# Patient Record
Sex: FEMALE | Race: WHITE | NOT HISPANIC OR LATINO | Employment: FULL TIME | URBAN - METROPOLITAN AREA
[De-identification: names, ages, dates, MRNs, and addresses within clinical notes are randomized per-mention and may not be internally consistent; named-entity substitution may affect disease eponyms.]

---

## 2020-08-20 ENCOUNTER — TELEPHONE (OUTPATIENT)
Dept: FAMILY MEDICINE | Facility: CLINIC | Age: 50
End: 2020-08-20

## 2020-08-20 NOTE — TELEPHONE ENCOUNTER
----- Message from Jaye Gonsales sent at 8/19/2020  3:17 PM CDT -----  Regarding: right knee  Name of Caller pt  Reason for Visit/Symptom right knee pain. Pt is requesting to be seen asap for est care and knee pain. Call pt  Best Contact Number or Confirm if Mychart Preferred. [phone] (home)     Preferred Date/Time of Appointment this week  Interested in Virtual Visit (yes/no)no  Additional Informatio

## 2020-09-17 ENCOUNTER — OFFICE VISIT (OUTPATIENT)
Dept: FAMILY MEDICINE | Facility: CLINIC | Age: 50
End: 2020-09-17
Payer: COMMERCIAL

## 2020-09-17 VITALS
HEIGHT: 67 IN | WEIGHT: 211.63 LBS | TEMPERATURE: 98 F | BODY MASS INDEX: 33.21 KG/M2 | OXYGEN SATURATION: 97 % | DIASTOLIC BLOOD PRESSURE: 80 MMHG | SYSTOLIC BLOOD PRESSURE: 122 MMHG | HEART RATE: 61 BPM

## 2020-09-17 DIAGNOSIS — Z11.59 ENCOUNTER FOR HEPATITIS C SCREENING TEST FOR LOW RISK PATIENT: ICD-10-CM

## 2020-09-17 DIAGNOSIS — M17.11 ARTHRITIS OF KNEE, RIGHT: ICD-10-CM

## 2020-09-17 DIAGNOSIS — Z00.00 ANNUAL PHYSICAL EXAM: Primary | ICD-10-CM

## 2020-09-17 DIAGNOSIS — Z12.39 BREAST CANCER SCREENING: ICD-10-CM

## 2020-09-17 PROCEDURE — 99999 PR PBB SHADOW E&M-EST. PATIENT-LVL III: CPT | Mod: PBBFAC,,, | Performed by: FAMILY MEDICINE

## 2020-09-17 PROCEDURE — 90686 IIV4 VACC NO PRSV 0.5 ML IM: CPT | Mod: S$GLB,,, | Performed by: FAMILY MEDICINE

## 2020-09-17 PROCEDURE — 3008F BODY MASS INDEX DOCD: CPT | Mod: CPTII,S$GLB,, | Performed by: FAMILY MEDICINE

## 2020-09-17 PROCEDURE — 3008F PR BODY MASS INDEX (BMI) DOCUMENTED: ICD-10-PCS | Mod: CPTII,S$GLB,, | Performed by: FAMILY MEDICINE

## 2020-09-17 PROCEDURE — 99204 OFFICE O/P NEW MOD 45 MIN: CPT | Mod: 25,S$GLB,, | Performed by: FAMILY MEDICINE

## 2020-09-17 PROCEDURE — 90686 FLU VACCINE (QUAD) GREATER THAN OR EQUAL TO 3YO PRESERVATIVE FREE IM: ICD-10-PCS | Mod: S$GLB,,, | Performed by: FAMILY MEDICINE

## 2020-09-17 PROCEDURE — 90471 IMMUNIZATION ADMIN: CPT | Mod: S$GLB,,, | Performed by: FAMILY MEDICINE

## 2020-09-17 PROCEDURE — 99999 PR PBB SHADOW E&M-EST. PATIENT-LVL III: ICD-10-PCS | Mod: PBBFAC,,, | Performed by: FAMILY MEDICINE

## 2020-09-17 PROCEDURE — 90471 FLU VACCINE (QUAD) GREATER THAN OR EQUAL TO 3YO PRESERVATIVE FREE IM: ICD-10-PCS | Mod: S$GLB,,, | Performed by: FAMILY MEDICINE

## 2020-09-17 PROCEDURE — 99204 PR OFFICE/OUTPT VISIT, NEW, LEVL IV, 45-59 MIN: ICD-10-PCS | Mod: 25,S$GLB,, | Performed by: FAMILY MEDICINE

## 2020-09-17 RX ORDER — CELECOXIB 100 MG/1
100 CAPSULE ORAL 2 TIMES DAILY
Qty: 60 CAPSULE | Refills: 5 | Status: SHIPPED | OUTPATIENT
Start: 2020-09-17 | End: 2021-07-23

## 2020-09-17 RX ORDER — MULTIVITAMIN
1 TABLET ORAL DAILY
COMMUNITY
End: 2021-06-29

## 2020-09-17 NOTE — PROGRESS NOTES
Administered Influenza Vaccine 0.5mL IM to right deltoid. No s/s of any adverse reaction noted.

## 2020-09-17 NOTE — PROGRESS NOTES
Subjective:       Patient ID: Wendy Cody is a 50 y.o. female.    Chief Complaint: Establish Care    50 year old female presents with as a new patient to me.     She has pain in her right knee. She state she tried to rest it to help. It is better in the morning and worsens with walking. She has had knee issues and has had injections in the past in her 20's. She has pain along the medial aspect of her knee. She wears insoles to help with arch support. She does run and cycle. She states it hurts with this. She takes naproxen, which doesn't help as much. Her knee doesn't swell or give out.     She also has a pain along the bottom of her foot.    She is due for a papsmear. She is due for a mammogram.     She had labs done last year. Her cholesterol is slightly elevated.     Past Medical History:  No date: TBI (traumatic brain injury)   Past Surgical History:  No date: BUNIONECTOMY      Comment:    No date:  SECTION      Comment:  ,   No date: hand surgery      Comment:  fracture and ligament and revision  No date: NASAL POLYP EXCISION  No date: TONSILLECTOMY  Review of patient's family history indicates:  Problem: Diabetes type II      Relation: Mother          Age of Onset: (Not Specified)  Problem: Lymphoma      Relation: Mother          Age of Onset: (Not Specified)  Problem: Heart attack      Relation: Father          Age of Onset: 38  Problem: Hyperlipidemia      Relation: Father          Age of Onset: (Not Specified)  Problem: Ovarian cancer      Relation: Sister          Age of Onset: (Not Specified)  Problem: Alzheimer's disease      Relation: Maternal Grandmother          Age of Onset: (Not Specified)  Problem: Alzheimer's disease      Relation: Paternal Grandmother          Age of Onset: (Not Specified)    Social History    Socioeconomic History      Marital status:       Spouse name: Not on file      Number of children: 2      Years of education: Not on file      Highest  "education level: Not on file    Occupational History        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    Social Needs      Financial resource strain: Not on file      Food insecurity        Worry: Not on file        Inability: Not on file      Transportation needs        Medical: Not on file        Non-medical: Not on file    Tobacco Use      Smoking status: Never Smoker      Smokeless tobacco: Never Used    Substance and Sexual Activity      Alcohol use: Not Currently      Drug use: Not on file      Sexual activity: Not on file    Lifestyle      Physical activity        Days per week: Not on file        Minutes per session: Not on file      Stress: Not on file    Relationships      Social connections        Talks on phone: Not on file        Gets together: Not on file        Attends Druze service: Not on file        Active member of club or organization: Not on file        Attends meetings of clubs or organizations: Not on file        Relationship status: Not on file    Other Topics      Concerns:        Not on file    Social History Narrative      Not on file        Review of Systems      Objective:       Vitals:    09/17/20 0846   BP: 122/80   Pulse: 61   Temp: 98 °F (36.7 °C)   TempSrc: Oral   SpO2: 97%   Weight: 96 kg (211 lb 10.3 oz)   Height: 5' 7" (1.702 m)     Physical Exam  Constitutional:       General: She is not in acute distress.     Appearance: She is well-developed. She is not diaphoretic.   HENT:      Head: Normocephalic.      Right Ear: External ear normal.      Left Ear: External ear normal.      Mouth/Throat:      Pharynx: No oropharyngeal exudate.   Eyes:      Conjunctiva/sclera: Conjunctivae normal.   Neck:      Musculoskeletal: Normal range of motion and neck supple.      Thyroid: No thyromegaly.   Cardiovascular:      Rate and Rhythm: Normal rate and regular rhythm.      Heart sounds: Normal heart sounds. No murmur. No friction rub. No gallop.  "   Pulmonary:      Effort: Pulmonary effort is normal. No respiratory distress.      Breath sounds: Normal breath sounds. No wheezing or rales.   Abdominal:      General: Bowel sounds are normal. There is no distension.      Palpations: Abdomen is soft. There is no mass.      Tenderness: There is no abdominal tenderness. There is no guarding or rebound.   Musculoskeletal:      Right knee: She exhibits normal range of motion, no swelling, no effusion, no ecchymosis, no LCL laxity, normal patellar mobility and no MCL laxity. Tenderness found. Medial joint line tenderness noted.        Feet:    Lymphadenopathy:      Cervical: No cervical adenopathy.   Skin:     Findings: No rash.   Neurological:      Mental Status: She is alert.   Psychiatric:         Mood and Affect: Mood normal.         Assessment:       1. Annual physical exam    2. Encounter for hepatitis C screening test for low risk patient    3. Arthritis of knee, right    4. Breast cancer screening        Plan:       Wendy was seen today for establish care.    Diagnoses and all orders for this visit:    Annual physical exam  -     Lipid Panel; Future  -     Comprehensive metabolic panel; Future  -     CBC auto differential; Future  -     TSH; Future  Due for labs  Encounter for hepatitis C screening test for low risk patient  -     Hepatitis C Antibody; Future    Arthritis of knee, right  -     celecoxib (CELEBREX) 100 MG capsule; Take 1 capsule (100 mg total) by mouth 2 (two) times daily.  Starting celebrex for knee arthritis.     Breast cancer screening  -     Mammo Digital Screening Bilat; Future  Due for mammogram  Other orders  -     Influenza - Quadrivalent *Preferred* (6 months+) (PF)

## 2020-09-17 NOTE — LETTER
September 17, 2020      [facility] [place] [address] [place] [address]  Phone: [phone]  Fax: [phone]       Patient: Wendy Cody   YOB: 1970  Date of Visit: 09/17/2020    To Whom It May Concern:    Shadia Cody  was at [facility] on 09/17/2020. She received the Influenza Vaccine. If you have any questions or concerns, or if I can be of further assistance, please do not hesitate to contact me.    Sincerely,        Saba Schulz LPN

## 2020-09-18 ENCOUNTER — HOSPITAL ENCOUNTER (OUTPATIENT)
Dept: RADIOLOGY | Facility: HOSPITAL | Age: 50
Discharge: HOME | End: 2020-09-18
Attending: FAMILY MEDICINE
Payer: COMMERCIAL

## 2020-09-18 DIAGNOSIS — Z12.39 BREAST CANCER SCREENING: ICD-10-CM

## 2020-09-18 PROCEDURE — 77067 MAMMO DIGITAL SCREENING BILAT WITH TOMO: ICD-10-PCS | Mod: 26,,, | Performed by: RADIOLOGY

## 2020-09-18 PROCEDURE — 77063 MAMMO DIGITAL SCREENING BILAT WITH TOMO: ICD-10-PCS | Mod: 26,,, | Performed by: RADIOLOGY

## 2020-09-18 PROCEDURE — 77063 BREAST TOMOSYNTHESIS BI: CPT | Mod: 26,,, | Performed by: RADIOLOGY

## 2020-09-18 PROCEDURE — 77067 SCR MAMMO BI INCL CAD: CPT | Mod: TC

## 2020-09-18 PROCEDURE — 77067 SCR MAMMO BI INCL CAD: CPT | Mod: 26,,, | Performed by: RADIOLOGY

## 2020-09-21 ENCOUNTER — TELEPHONE (OUTPATIENT)
Dept: FAMILY MEDICINE | Facility: CLINIC | Age: 50
End: 2020-09-21

## 2020-09-21 RX ORDER — PRAVASTATIN SODIUM 40 MG/1
40 TABLET ORAL DAILY
Qty: 90 TABLET | Refills: 3 | Status: SHIPPED | OUTPATIENT
Start: 2020-09-21 | End: 2021-07-23

## 2020-09-23 ENCOUNTER — TELEPHONE (OUTPATIENT)
Dept: FAMILY MEDICINE | Facility: CLINIC | Age: 50
End: 2020-09-23

## 2020-09-23 NOTE — TELEPHONE ENCOUNTER
----- Message from Joana Goldberg MD sent at 9/21/2020  8:38 PM CDT -----  Please let patient know her mammogram results are normal. Repeat in 1 year.

## 2020-09-29 ENCOUNTER — OFFICE VISIT (OUTPATIENT)
Dept: FAMILY MEDICINE | Facility: CLINIC | Age: 50
End: 2020-09-29
Payer: COMMERCIAL

## 2020-09-29 VITALS
TEMPERATURE: 98 F | BODY MASS INDEX: 33.57 KG/M2 | OXYGEN SATURATION: 98 % | WEIGHT: 213.88 LBS | HEIGHT: 67 IN | SYSTOLIC BLOOD PRESSURE: 126 MMHG | DIASTOLIC BLOOD PRESSURE: 84 MMHG | HEART RATE: 66 BPM

## 2020-09-29 DIAGNOSIS — Z01.419 ROUTINE GYNECOLOGICAL EXAMINATION: Primary | ICD-10-CM

## 2020-09-29 DIAGNOSIS — M17.11 ARTHRITIS OF KNEE, RIGHT: ICD-10-CM

## 2020-09-29 PROCEDURE — 87624 HPV HI-RISK TYP POOLED RSLT: CPT

## 2020-09-29 PROCEDURE — 99214 PR OFFICE/OUTPT VISIT, EST, LEVL IV, 30-39 MIN: ICD-10-PCS | Mod: S$GLB,,, | Performed by: FAMILY MEDICINE

## 2020-09-29 PROCEDURE — 99999 PR PBB SHADOW E&M-EST. PATIENT-LVL III: ICD-10-PCS | Mod: PBBFAC,,, | Performed by: FAMILY MEDICINE

## 2020-09-29 PROCEDURE — 3008F BODY MASS INDEX DOCD: CPT | Mod: CPTII,S$GLB,, | Performed by: FAMILY MEDICINE

## 2020-09-29 PROCEDURE — 3008F PR BODY MASS INDEX (BMI) DOCUMENTED: ICD-10-PCS | Mod: CPTII,S$GLB,, | Performed by: FAMILY MEDICINE

## 2020-09-29 PROCEDURE — 99999 PR PBB SHADOW E&M-EST. PATIENT-LVL III: CPT | Mod: PBBFAC,,, | Performed by: FAMILY MEDICINE

## 2020-09-29 PROCEDURE — 88175 CYTOPATH C/V AUTO FLUID REDO: CPT

## 2020-09-29 PROCEDURE — 99214 OFFICE O/P EST MOD 30 MIN: CPT | Mod: S$GLB,,, | Performed by: FAMILY MEDICINE

## 2020-09-29 NOTE — PROGRESS NOTES
Subjective:       Patient ID: Wendy Cody is a 50 y.o. female.    Chief Complaint: Gynecologic Exam and Discuss knee concerns    50 year old female presents for followup on right knee pain. She states the celebrex has helped, but she started running again. She reinjured it again. She has pain along the medial joint line. She is using a knee brace that helps. It is better today.       She is also here for her papsmear. Her last papsmear was 4 years ago. It was normal. She has no vaginal discharge or irritation. She has no dysuria. She has no pelvic pain. She states her sister had ovarian cancer. She has no family history of breast or uterine cancer.     Past Medical History:   Diagnosis Date    TBI (traumatic brain injury)       Past Surgical History:   Procedure Laterality Date    BUNIONECTOMY      2015     SECTION      ,     hand surgery      fracture and ligament and revision    NASAL POLYP EXCISION      TONSILLECTOMY       Family History   Problem Relation Age of Onset    Diabetes type II Mother     Lymphoma Mother     Heart attack Father 38    Hyperlipidemia Father     Ovarian cancer Sister 38    Alzheimer's disease Maternal Grandmother     Alzheimer's disease Paternal Grandmother      Social History     Socioeconomic History    Marital status:      Spouse name: Not on file    Number of children: 2    Years of education: Not on file    Highest education level: Not on file   Occupational History     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   Social Needs    Financial resource strain: Not on file    Food insecurity     Worry: Not on file     Inability: Not on file    Transportation needs     Medical: Not on file     Non-medical: Not on file   Tobacco Use    Smoking status: Never Smoker    Smokeless tobacco: Never Used   Substance and Sexual Activity    Alcohol use: Not Currently    Drug use: Not on file    Sexual activity:  "Not on file   Lifestyle    Physical activity     Days per week: Not on file     Minutes per session: Not on file    Stress: Not on file   Relationships    Social connections     Talks on phone: Not on file     Gets together: Not on file     Attends Zoroastrianism service: Not on file     Active member of club or organization: Not on file     Attends meetings of clubs or organizations: Not on file     Relationship status: Not on file   Other Topics Concern    Not on file   Social History Narrative    Not on file       Review of Systems      Objective:       Vitals:    09/29/20 1016   BP: 126/84   Pulse: 66   Temp: 98.4 °F (36.9 °C)   TempSrc: Oral   SpO2: 98%   Weight: 97 kg (213 lb 13.5 oz)   Height: 5' 7" (1.702 m)       Physical Exam  Constitutional:       General: She is not in acute distress.     Appearance: She is well-developed. She is not ill-appearing, toxic-appearing or diaphoretic.   HENT:      Head: Normocephalic and atraumatic.   Eyes:      Conjunctiva/sclera: Conjunctivae normal.   Neck:      Musculoskeletal: Normal range of motion and neck supple.      Thyroid: No thyromegaly.   Cardiovascular:      Rate and Rhythm: Normal rate and regular rhythm.      Heart sounds: Normal heart sounds. No murmur. No friction rub. No gallop.    Pulmonary:      Effort: Pulmonary effort is normal. No respiratory distress.      Breath sounds: Normal breath sounds. No stridor. No wheezing, rhonchi or rales.   Chest:      Chest wall: No tenderness.      Breasts: Breasts are symmetrical.         Right: No inverted nipple, mass, nipple discharge, skin change or tenderness.         Left: No inverted nipple, mass, nipple discharge, skin change or tenderness.   Genitourinary:     Labia:         Right: No rash, tenderness, lesion or injury.         Left: No rash, tenderness, lesion or injury.       Vagina: No signs of injury and foreign body. No vaginal discharge, erythema, tenderness or bleeding.      Cervix: No cervical motion " tenderness, discharge or friability.      Uterus: Enlarged. Not deviated.    Musculoskeletal:      Right knee: She exhibits swelling and effusion. She exhibits normal range of motion, no ecchymosis, no deformity, normal alignment, no LCL laxity, normal patellar mobility, normal meniscus and no MCL laxity. Tenderness found. Medial joint line tenderness noted.        Legs:    Neurological:      Mental Status: She is alert.         Assessment:       1. Routine gynecological examination    2. Arthritis of knee, right        Plan:       Wendy was seen today for gynecologic exam and discuss knee concerns.    Diagnoses and all orders for this visit:    Routine gynecological examination  -     Liquid-Based Pap Smear, Screening  -     HPV High Risk Genotypes, PCR    Arthritis of knee, right         Apply a compressive ACE bandage. Rest and elevate the affected painful area.  Apply cold compresses intermittently as needed.  As pain recedes, begin normal activities slowly as tolerated.  Call if symptoms persist.

## 2020-10-06 ENCOUNTER — OFFICE VISIT (OUTPATIENT)
Dept: FAMILY MEDICINE | Facility: CLINIC | Age: 50
End: 2020-10-06
Payer: COMMERCIAL

## 2020-10-06 VITALS
SYSTOLIC BLOOD PRESSURE: 134 MMHG | OXYGEN SATURATION: 99 % | BODY MASS INDEX: 33.12 KG/M2 | RESPIRATION RATE: 16 BRPM | TEMPERATURE: 98 F | HEIGHT: 67 IN | HEART RATE: 62 BPM | DIASTOLIC BLOOD PRESSURE: 80 MMHG | WEIGHT: 211 LBS

## 2020-10-06 DIAGNOSIS — L08.9 INFECTED LESION OF SKIN: Primary | ICD-10-CM

## 2020-10-06 PROCEDURE — 3008F PR BODY MASS INDEX (BMI) DOCUMENTED: ICD-10-PCS | Mod: CPTII,S$GLB,, | Performed by: PHYSICIAN ASSISTANT

## 2020-10-06 PROCEDURE — 99214 PR OFFICE/OUTPT VISIT, EST, LEVL IV, 30-39 MIN: ICD-10-PCS | Mod: S$GLB,,, | Performed by: PHYSICIAN ASSISTANT

## 2020-10-06 PROCEDURE — 99999 PR PBB SHADOW E&M-EST. PATIENT-LVL III: CPT | Mod: PBBFAC,,, | Performed by: PHYSICIAN ASSISTANT

## 2020-10-06 PROCEDURE — 99214 OFFICE O/P EST MOD 30 MIN: CPT | Mod: S$GLB,,, | Performed by: PHYSICIAN ASSISTANT

## 2020-10-06 PROCEDURE — 3008F BODY MASS INDEX DOCD: CPT | Mod: CPTII,S$GLB,, | Performed by: PHYSICIAN ASSISTANT

## 2020-10-06 PROCEDURE — 99999 PR PBB SHADOW E&M-EST. PATIENT-LVL III: ICD-10-PCS | Mod: PBBFAC,,, | Performed by: PHYSICIAN ASSISTANT

## 2020-10-06 RX ORDER — CEPHALEXIN 500 MG/1
500 CAPSULE ORAL EVERY 12 HOURS
Qty: 14 CAPSULE | Refills: 0 | Status: SHIPPED | OUTPATIENT
Start: 2020-10-06 | End: 2020-10-13

## 2020-10-09 ENCOUNTER — PATIENT MESSAGE (OUTPATIENT)
Dept: FAMILY MEDICINE | Facility: CLINIC | Age: 50
End: 2020-10-09

## 2020-10-12 LAB
HPV HR 12 DNA SPEC QL NAA+PROBE: NEGATIVE
HPV16 AG SPEC QL: NEGATIVE
HPV18 DNA SPEC QL NAA+PROBE: NEGATIVE

## 2020-10-21 LAB
FINAL PATHOLOGIC DIAGNOSIS: NORMAL
Lab: NORMAL

## 2020-12-22 ENCOUNTER — PATIENT MESSAGE (OUTPATIENT)
Dept: FAMILY MEDICINE | Facility: CLINIC | Age: 50
End: 2020-12-22

## 2020-12-22 DIAGNOSIS — E78.5 HYPERLIPIDEMIA, UNSPECIFIED HYPERLIPIDEMIA TYPE: ICD-10-CM

## 2020-12-29 ENCOUNTER — LAB VISIT (OUTPATIENT)
Dept: LAB | Facility: HOSPITAL | Age: 50
End: 2020-12-29
Attending: FAMILY MEDICINE
Payer: COMMERCIAL

## 2020-12-29 DIAGNOSIS — E78.5 HYPERLIPIDEMIA, UNSPECIFIED HYPERLIPIDEMIA TYPE: ICD-10-CM

## 2020-12-29 LAB
ALBUMIN SERPL BCP-MCNC: 3.9 G/DL (ref 3.5–5.2)
ALP SERPL-CCNC: 59 U/L (ref 55–135)
ALT SERPL W/O P-5'-P-CCNC: 16 U/L (ref 10–44)
ANION GAP SERPL CALC-SCNC: 5 MMOL/L (ref 8–16)
AST SERPL-CCNC: 16 U/L (ref 10–40)
BILIRUB SERPL-MCNC: 0.4 MG/DL (ref 0.1–1)
BUN SERPL-MCNC: 10 MG/DL (ref 6–20)
CALCIUM SERPL-MCNC: 8.4 MG/DL (ref 8.7–10.5)
CHLORIDE SERPL-SCNC: 107 MMOL/L (ref 95–110)
CHOLEST SERPL-MCNC: 217 MG/DL (ref 120–199)
CHOLEST/HDLC SERPL: 5 {RATIO} (ref 2–5)
CO2 SERPL-SCNC: 28 MMOL/L (ref 23–29)
CREAT SERPL-MCNC: 0.8 MG/DL (ref 0.5–1.4)
EST. GFR  (AFRICAN AMERICAN): >60 ML/MIN/1.73 M^2
EST. GFR  (NON AFRICAN AMERICAN): >60 ML/MIN/1.73 M^2
GLUCOSE SERPL-MCNC: 95 MG/DL (ref 70–110)
HDLC SERPL-MCNC: 43 MG/DL (ref 40–75)
HDLC SERPL: 19.8 % (ref 20–50)
LDLC SERPL CALC-MCNC: 151.6 MG/DL (ref 63–159)
NONHDLC SERPL-MCNC: 174 MG/DL
POTASSIUM SERPL-SCNC: 4.6 MMOL/L (ref 3.5–5.1)
PROT SERPL-MCNC: 6.9 G/DL (ref 6–8.4)
SODIUM SERPL-SCNC: 140 MMOL/L (ref 136–145)
TRIGL SERPL-MCNC: 112 MG/DL (ref 30–150)

## 2020-12-29 PROCEDURE — 80053 COMPREHEN METABOLIC PANEL: CPT

## 2020-12-29 PROCEDURE — 36415 COLL VENOUS BLD VENIPUNCTURE: CPT

## 2020-12-29 PROCEDURE — 80061 LIPID PANEL: CPT

## 2021-06-29 ENCOUNTER — OFFICE VISIT (OUTPATIENT)
Dept: FAMILY MEDICINE | Facility: CLINIC | Age: 51
End: 2021-06-29
Payer: COMMERCIAL

## 2021-06-29 VITALS
OXYGEN SATURATION: 98 % | WEIGHT: 207.44 LBS | TEMPERATURE: 98 F | HEIGHT: 67 IN | SYSTOLIC BLOOD PRESSURE: 138 MMHG | HEART RATE: 58 BPM | BODY MASS INDEX: 32.56 KG/M2 | DIASTOLIC BLOOD PRESSURE: 82 MMHG

## 2021-06-29 DIAGNOSIS — M54.41 CHRONIC RIGHT-SIDED LOW BACK PAIN WITH RIGHT-SIDED SCIATICA: Primary | ICD-10-CM

## 2021-06-29 DIAGNOSIS — G89.29 CHRONIC RIGHT-SIDED LOW BACK PAIN WITH RIGHT-SIDED SCIATICA: Primary | ICD-10-CM

## 2021-06-29 PROCEDURE — 3008F PR BODY MASS INDEX (BMI) DOCUMENTED: ICD-10-PCS | Mod: CPTII,S$GLB,, | Performed by: NURSE PRACTITIONER

## 2021-06-29 PROCEDURE — 99999 PR PBB SHADOW E&M-EST. PATIENT-LVL IV: ICD-10-PCS | Mod: PBBFAC,,, | Performed by: NURSE PRACTITIONER

## 2021-06-29 PROCEDURE — 99214 PR OFFICE/OUTPT VISIT, EST, LEVL IV, 30-39 MIN: ICD-10-PCS | Mod: S$GLB,,, | Performed by: NURSE PRACTITIONER

## 2021-06-29 PROCEDURE — 3008F BODY MASS INDEX DOCD: CPT | Mod: CPTII,S$GLB,, | Performed by: NURSE PRACTITIONER

## 2021-06-29 PROCEDURE — 99999 PR PBB SHADOW E&M-EST. PATIENT-LVL IV: CPT | Mod: PBBFAC,,, | Performed by: NURSE PRACTITIONER

## 2021-06-29 PROCEDURE — 1125F PR PAIN SEVERITY QUANTIFIED, PAIN PRESENT: ICD-10-PCS | Mod: S$GLB,,, | Performed by: NURSE PRACTITIONER

## 2021-06-29 PROCEDURE — 1125F AMNT PAIN NOTED PAIN PRSNT: CPT | Mod: S$GLB,,, | Performed by: NURSE PRACTITIONER

## 2021-06-29 PROCEDURE — 99214 OFFICE O/P EST MOD 30 MIN: CPT | Mod: S$GLB,,, | Performed by: NURSE PRACTITIONER

## 2021-07-22 ENCOUNTER — TELEPHONE (OUTPATIENT)
Dept: PAIN MEDICINE | Facility: CLINIC | Age: 51
End: 2021-07-22

## 2021-07-23 ENCOUNTER — OFFICE VISIT (OUTPATIENT)
Dept: PAIN MEDICINE | Facility: CLINIC | Age: 51
End: 2021-07-23
Payer: COMMERCIAL

## 2021-07-23 VITALS
HEART RATE: 60 BPM | TEMPERATURE: 98 F | OXYGEN SATURATION: 99 % | HEIGHT: 67 IN | DIASTOLIC BLOOD PRESSURE: 78 MMHG | WEIGHT: 205.69 LBS | RESPIRATION RATE: 18 BRPM | SYSTOLIC BLOOD PRESSURE: 136 MMHG | BODY MASS INDEX: 32.28 KG/M2

## 2021-07-23 DIAGNOSIS — M54.16 LUMBAR RADICULOPATHY: ICD-10-CM

## 2021-07-23 DIAGNOSIS — M54.41 CHRONIC RIGHT-SIDED LOW BACK PAIN WITH RIGHT-SIDED SCIATICA: ICD-10-CM

## 2021-07-23 DIAGNOSIS — M47.816 LUMBAR SPONDYLOSIS: ICD-10-CM

## 2021-07-23 DIAGNOSIS — G89.29 CHRONIC RIGHT-SIDED LOW BACK PAIN WITH RIGHT-SIDED SCIATICA: ICD-10-CM

## 2021-07-23 DIAGNOSIS — M51.36 DDD (DEGENERATIVE DISC DISEASE), LUMBAR: Primary | ICD-10-CM

## 2021-07-23 PROBLEM — M51.369 DDD (DEGENERATIVE DISC DISEASE), LUMBAR: Status: ACTIVE | Noted: 2021-07-23

## 2021-07-23 PROCEDURE — 99204 PR OFFICE/OUTPT VISIT, NEW, LEVL IV, 45-59 MIN: ICD-10-PCS | Mod: S$GLB,,, | Performed by: PAIN MEDICINE

## 2021-07-23 PROCEDURE — 99204 OFFICE O/P NEW MOD 45 MIN: CPT | Mod: S$GLB,,, | Performed by: PAIN MEDICINE

## 2021-07-23 PROCEDURE — 3008F PR BODY MASS INDEX (BMI) DOCUMENTED: ICD-10-PCS | Mod: CPTII,S$GLB,, | Performed by: PAIN MEDICINE

## 2021-07-23 PROCEDURE — 1125F PR PAIN SEVERITY QUANTIFIED, PAIN PRESENT: ICD-10-PCS | Mod: CPTII,S$GLB,, | Performed by: PAIN MEDICINE

## 2021-07-23 PROCEDURE — 99999 PR PBB SHADOW E&M-EST. PATIENT-LVL V: CPT | Mod: PBBFAC,,, | Performed by: PAIN MEDICINE

## 2021-07-23 PROCEDURE — 1125F AMNT PAIN NOTED PAIN PRSNT: CPT | Mod: CPTII,S$GLB,, | Performed by: PAIN MEDICINE

## 2021-07-23 PROCEDURE — 99999 PR PBB SHADOW E&M-EST. PATIENT-LVL V: ICD-10-PCS | Mod: PBBFAC,,, | Performed by: PAIN MEDICINE

## 2021-07-23 PROCEDURE — 3008F BODY MASS INDEX DOCD: CPT | Mod: CPTII,S$GLB,, | Performed by: PAIN MEDICINE

## 2021-08-06 ENCOUNTER — TELEPHONE (OUTPATIENT)
Dept: PAIN MEDICINE | Facility: CLINIC | Age: 51
End: 2021-08-06

## 2021-08-09 ENCOUNTER — OFFICE VISIT (OUTPATIENT)
Dept: PAIN MEDICINE | Facility: CLINIC | Age: 51
End: 2021-08-09
Payer: COMMERCIAL

## 2021-08-09 VITALS
SYSTOLIC BLOOD PRESSURE: 137 MMHG | BODY MASS INDEX: 32.36 KG/M2 | WEIGHT: 206.19 LBS | TEMPERATURE: 98 F | OXYGEN SATURATION: 97 % | HEIGHT: 67 IN | HEART RATE: 60 BPM | DIASTOLIC BLOOD PRESSURE: 82 MMHG

## 2021-08-09 DIAGNOSIS — M51.36 DDD (DEGENERATIVE DISC DISEASE), LUMBAR: Primary | ICD-10-CM

## 2021-08-09 DIAGNOSIS — M54.16 LUMBAR RADICULOPATHY: ICD-10-CM

## 2021-08-09 DIAGNOSIS — M47.816 LUMBAR SPONDYLOSIS: ICD-10-CM

## 2021-08-09 PROCEDURE — 99213 PR OFFICE/OUTPT VISIT, EST, LEVL III, 20-29 MIN: ICD-10-PCS | Mod: S$GLB,,, | Performed by: PAIN MEDICINE

## 2021-08-09 PROCEDURE — 1159F PR MEDICATION LIST DOCUMENTED IN MEDICAL RECORD: ICD-10-PCS | Mod: CPTII,S$GLB,, | Performed by: PAIN MEDICINE

## 2021-08-09 PROCEDURE — 1160F PR REVIEW ALL MEDS BY PRESCRIBER/CLIN PHARMACIST DOCUMENTED: ICD-10-PCS | Mod: CPTII,S$GLB,, | Performed by: PAIN MEDICINE

## 2021-08-09 PROCEDURE — 99999 PR PBB SHADOW E&M-EST. PATIENT-LVL III: CPT | Mod: PBBFAC,,, | Performed by: PAIN MEDICINE

## 2021-08-09 PROCEDURE — 99999 PR PBB SHADOW E&M-EST. PATIENT-LVL III: ICD-10-PCS | Mod: PBBFAC,,, | Performed by: PAIN MEDICINE

## 2021-08-09 PROCEDURE — 1125F PR PAIN SEVERITY QUANTIFIED, PAIN PRESENT: ICD-10-PCS | Mod: CPTII,S$GLB,, | Performed by: PAIN MEDICINE

## 2021-08-09 PROCEDURE — 3075F SYST BP GE 130 - 139MM HG: CPT | Mod: CPTII,S$GLB,, | Performed by: PAIN MEDICINE

## 2021-08-09 PROCEDURE — 3079F DIAST BP 80-89 MM HG: CPT | Mod: CPTII,S$GLB,, | Performed by: PAIN MEDICINE

## 2021-08-09 PROCEDURE — 1125F AMNT PAIN NOTED PAIN PRSNT: CPT | Mod: CPTII,S$GLB,, | Performed by: PAIN MEDICINE

## 2021-08-09 PROCEDURE — 3079F PR MOST RECENT DIASTOLIC BLOOD PRESSURE 80-89 MM HG: ICD-10-PCS | Mod: CPTII,S$GLB,, | Performed by: PAIN MEDICINE

## 2021-08-09 PROCEDURE — 3008F BODY MASS INDEX DOCD: CPT | Mod: CPTII,S$GLB,, | Performed by: PAIN MEDICINE

## 2021-08-09 PROCEDURE — 3008F PR BODY MASS INDEX (BMI) DOCUMENTED: ICD-10-PCS | Mod: CPTII,S$GLB,, | Performed by: PAIN MEDICINE

## 2021-08-09 PROCEDURE — 1159F MED LIST DOCD IN RCRD: CPT | Mod: CPTII,S$GLB,, | Performed by: PAIN MEDICINE

## 2021-08-09 PROCEDURE — 3075F PR MOST RECENT SYSTOLIC BLOOD PRESS GE 130-139MM HG: ICD-10-PCS | Mod: CPTII,S$GLB,, | Performed by: PAIN MEDICINE

## 2021-08-09 PROCEDURE — 1160F RVW MEDS BY RX/DR IN RCRD: CPT | Mod: CPTII,S$GLB,, | Performed by: PAIN MEDICINE

## 2021-08-09 PROCEDURE — 99213 OFFICE O/P EST LOW 20 MIN: CPT | Mod: S$GLB,,, | Performed by: PAIN MEDICINE

## 2021-12-08 ENCOUNTER — PATIENT MESSAGE (OUTPATIENT)
Dept: ADMINISTRATIVE | Facility: HOSPITAL | Age: 51
End: 2021-12-08
Payer: COMMERCIAL

## 2021-12-08 DIAGNOSIS — Z12.31 OTHER SCREENING MAMMOGRAM: ICD-10-CM

## 2021-12-20 ENCOUNTER — OFFICE VISIT (OUTPATIENT)
Dept: FAMILY MEDICINE | Facility: CLINIC | Age: 51
End: 2021-12-20
Payer: COMMERCIAL

## 2021-12-20 VITALS
BODY MASS INDEX: 34.95 KG/M2 | TEMPERATURE: 98 F | OXYGEN SATURATION: 98 % | HEART RATE: 70 BPM | SYSTOLIC BLOOD PRESSURE: 120 MMHG | DIASTOLIC BLOOD PRESSURE: 84 MMHG | WEIGHT: 222.69 LBS | HEIGHT: 67 IN

## 2021-12-20 DIAGNOSIS — Z00.00 ANNUAL PHYSICAL EXAM: Primary | ICD-10-CM

## 2021-12-20 DIAGNOSIS — Z63.79 STRESSFUL LIFE EVENT AFFECTING FAMILY: ICD-10-CM

## 2021-12-20 DIAGNOSIS — R53.83 FATIGUE, UNSPECIFIED TYPE: ICD-10-CM

## 2021-12-20 PROCEDURE — 99999 PR PBB SHADOW E&M-EST. PATIENT-LVL III: CPT | Mod: PBBFAC,,, | Performed by: FAMILY MEDICINE

## 2021-12-20 PROCEDURE — 99999 PR PBB SHADOW E&M-EST. PATIENT-LVL III: ICD-10-PCS | Mod: PBBFAC,,, | Performed by: FAMILY MEDICINE

## 2021-12-20 PROCEDURE — 99396 PR PREVENTIVE VISIT,EST,40-64: ICD-10-PCS | Mod: S$GLB,,, | Performed by: FAMILY MEDICINE

## 2021-12-20 PROCEDURE — 99396 PREV VISIT EST AGE 40-64: CPT | Mod: S$GLB,,, | Performed by: FAMILY MEDICINE

## 2021-12-21 ENCOUNTER — LAB VISIT (OUTPATIENT)
Dept: LAB | Facility: HOSPITAL | Age: 51
End: 2021-12-21
Attending: FAMILY MEDICINE
Payer: COMMERCIAL

## 2021-12-21 DIAGNOSIS — Z00.00 ANNUAL PHYSICAL EXAM: ICD-10-CM

## 2021-12-21 LAB
ALBUMIN SERPL BCP-MCNC: 3.9 G/DL (ref 3.5–5.2)
ALP SERPL-CCNC: 64 U/L (ref 55–135)
ALT SERPL W/O P-5'-P-CCNC: 22 U/L (ref 10–44)
ANION GAP SERPL CALC-SCNC: 9 MMOL/L (ref 8–16)
AST SERPL-CCNC: 21 U/L (ref 10–40)
BASOPHILS # BLD AUTO: 0.05 K/UL (ref 0–0.2)
BASOPHILS NFR BLD: 0.9 % (ref 0–1.9)
BILIRUB SERPL-MCNC: 0.8 MG/DL (ref 0.1–1)
BUN SERPL-MCNC: 13 MG/DL (ref 6–20)
CALCIUM SERPL-MCNC: 8.9 MG/DL (ref 8.7–10.5)
CHLORIDE SERPL-SCNC: 105 MMOL/L (ref 95–110)
CHOLEST SERPL-MCNC: 265 MG/DL (ref 120–199)
CHOLEST/HDLC SERPL: 5.5 {RATIO} (ref 2–5)
CO2 SERPL-SCNC: 28 MMOL/L (ref 23–29)
CREAT SERPL-MCNC: 0.9 MG/DL (ref 0.5–1.4)
DIFFERENTIAL METHOD: NORMAL
EOSINOPHIL # BLD AUTO: 0.1 K/UL (ref 0–0.5)
EOSINOPHIL NFR BLD: 2.3 % (ref 0–8)
ERYTHROCYTE [DISTWIDTH] IN BLOOD BY AUTOMATED COUNT: 12.3 % (ref 11.5–14.5)
EST. GFR  (AFRICAN AMERICAN): >60 ML/MIN/1.73 M^2
EST. GFR  (NON AFRICAN AMERICAN): >60 ML/MIN/1.73 M^2
GLUCOSE SERPL-MCNC: 80 MG/DL (ref 70–110)
HCT VFR BLD AUTO: 41.9 % (ref 37–48.5)
HDLC SERPL-MCNC: 48 MG/DL (ref 40–75)
HDLC SERPL: 18.1 % (ref 20–50)
HGB BLD-MCNC: 13.7 G/DL (ref 12–16)
IMM GRANULOCYTES # BLD AUTO: 0.01 K/UL (ref 0–0.04)
IMM GRANULOCYTES NFR BLD AUTO: 0.2 % (ref 0–0.5)
LDLC SERPL CALC-MCNC: 196.4 MG/DL (ref 63–159)
LYMPHOCYTES # BLD AUTO: 1.6 K/UL (ref 1–4.8)
LYMPHOCYTES NFR BLD: 28.5 % (ref 18–48)
MCH RBC QN AUTO: 29.1 PG (ref 27–31)
MCHC RBC AUTO-ENTMCNC: 32.7 G/DL (ref 32–36)
MCV RBC AUTO: 89 FL (ref 82–98)
MONOCYTES # BLD AUTO: 0.5 K/UL (ref 0.3–1)
MONOCYTES NFR BLD: 9.5 % (ref 4–15)
NEUTROPHILS # BLD AUTO: 3.3 K/UL (ref 1.8–7.7)
NEUTROPHILS NFR BLD: 58.6 % (ref 38–73)
NONHDLC SERPL-MCNC: 217 MG/DL
NRBC BLD-RTO: 0 /100 WBC
PLATELET # BLD AUTO: 248 K/UL (ref 150–450)
PMV BLD AUTO: 10.8 FL (ref 9.2–12.9)
POTASSIUM SERPL-SCNC: 4.5 MMOL/L (ref 3.5–5.1)
PROT SERPL-MCNC: 6.9 G/DL (ref 6–8.4)
RBC # BLD AUTO: 4.71 M/UL (ref 4–5.4)
SODIUM SERPL-SCNC: 142 MMOL/L (ref 136–145)
TRIGL SERPL-MCNC: 103 MG/DL (ref 30–150)
TSH SERPL DL<=0.005 MIU/L-ACNC: 3.75 UIU/ML (ref 0.4–4)
WBC # BLD AUTO: 5.69 K/UL (ref 3.9–12.7)

## 2021-12-21 PROCEDURE — 85025 COMPLETE CBC W/AUTO DIFF WBC: CPT | Performed by: FAMILY MEDICINE

## 2021-12-21 PROCEDURE — 80061 LIPID PANEL: CPT | Performed by: FAMILY MEDICINE

## 2021-12-21 PROCEDURE — 80053 COMPREHEN METABOLIC PANEL: CPT | Performed by: FAMILY MEDICINE

## 2021-12-21 PROCEDURE — 84443 ASSAY THYROID STIM HORMONE: CPT | Performed by: FAMILY MEDICINE

## 2021-12-28 ENCOUNTER — PATIENT OUTREACH (OUTPATIENT)
Dept: ADMINISTRATIVE | Facility: OTHER | Age: 51
End: 2021-12-28
Payer: COMMERCIAL

## 2021-12-28 ENCOUNTER — HOSPITAL ENCOUNTER (OUTPATIENT)
Dept: RADIOLOGY | Facility: HOSPITAL | Age: 51
Discharge: HOME | End: 2021-12-28
Attending: FAMILY MEDICINE
Payer: COMMERCIAL

## 2021-12-28 DIAGNOSIS — Z12.31 OTHER SCREENING MAMMOGRAM: ICD-10-CM

## 2021-12-28 PROCEDURE — 77067 SCR MAMMO BI INCL CAD: CPT | Mod: TC

## 2021-12-28 PROCEDURE — 77063 BREAST TOMOSYNTHESIS BI: CPT | Mod: 26,,, | Performed by: RADIOLOGY

## 2021-12-28 PROCEDURE — 77063 MAMMO DIGITAL SCREENING BILAT WITH TOMO: ICD-10-PCS | Mod: 26,,, | Performed by: RADIOLOGY

## 2021-12-28 PROCEDURE — 77067 SCR MAMMO BI INCL CAD: CPT | Mod: 26,,, | Performed by: RADIOLOGY

## 2021-12-28 PROCEDURE — 77067 MAMMO DIGITAL SCREENING BILAT WITH TOMO: ICD-10-PCS | Mod: 26,,, | Performed by: RADIOLOGY

## 2021-12-30 ENCOUNTER — OFFICE VISIT (OUTPATIENT)
Dept: PULMONOLOGY | Facility: CLINIC | Age: 51
End: 2021-12-30
Payer: COMMERCIAL

## 2021-12-30 ENCOUNTER — PATIENT MESSAGE (OUTPATIENT)
Dept: FAMILY MEDICINE | Facility: CLINIC | Age: 51
End: 2021-12-30
Payer: COMMERCIAL

## 2021-12-30 VITALS
TEMPERATURE: 98 F | HEIGHT: 67 IN | BODY MASS INDEX: 35 KG/M2 | DIASTOLIC BLOOD PRESSURE: 77 MMHG | WEIGHT: 223 LBS | HEART RATE: 79 BPM | SYSTOLIC BLOOD PRESSURE: 126 MMHG | OXYGEN SATURATION: 96 %

## 2021-12-30 DIAGNOSIS — I83.899 RUPTURED VARICOSITY: ICD-10-CM

## 2021-12-30 DIAGNOSIS — G47.30 SLEEP-RELATED BREATHING DISORDER: Primary | ICD-10-CM

## 2021-12-30 PROCEDURE — 3074F PR MOST RECENT SYSTOLIC BLOOD PRESSURE < 130 MM HG: ICD-10-PCS | Mod: CPTII,S$GLB,, | Performed by: NURSE PRACTITIONER

## 2021-12-30 PROCEDURE — 3078F PR MOST RECENT DIASTOLIC BLOOD PRESSURE < 80 MM HG: ICD-10-PCS | Mod: CPTII,S$GLB,, | Performed by: NURSE PRACTITIONER

## 2021-12-30 PROCEDURE — 3008F BODY MASS INDEX DOCD: CPT | Mod: CPTII,S$GLB,, | Performed by: NURSE PRACTITIONER

## 2021-12-30 PROCEDURE — 3008F PR BODY MASS INDEX (BMI) DOCUMENTED: ICD-10-PCS | Mod: CPTII,S$GLB,, | Performed by: NURSE PRACTITIONER

## 2021-12-30 PROCEDURE — 99203 OFFICE O/P NEW LOW 30 MIN: CPT | Mod: S$GLB,,, | Performed by: NURSE PRACTITIONER

## 2021-12-30 PROCEDURE — 99999 PR PBB SHADOW E&M-EST. PATIENT-LVL V: CPT | Mod: PBBFAC,,, | Performed by: NURSE PRACTITIONER

## 2021-12-30 PROCEDURE — 1159F MED LIST DOCD IN RCRD: CPT | Mod: CPTII,S$GLB,, | Performed by: NURSE PRACTITIONER

## 2021-12-30 PROCEDURE — 3074F SYST BP LT 130 MM HG: CPT | Mod: CPTII,S$GLB,, | Performed by: NURSE PRACTITIONER

## 2021-12-30 PROCEDURE — 3078F DIAST BP <80 MM HG: CPT | Mod: CPTII,S$GLB,, | Performed by: NURSE PRACTITIONER

## 2021-12-30 PROCEDURE — 99203 PR OFFICE/OUTPT VISIT, NEW, LEVL III, 30-44 MIN: ICD-10-PCS | Mod: S$GLB,,, | Performed by: NURSE PRACTITIONER

## 2021-12-30 PROCEDURE — 1159F PR MEDICATION LIST DOCUMENTED IN MEDICAL RECORD: ICD-10-PCS | Mod: CPTII,S$GLB,, | Performed by: NURSE PRACTITIONER

## 2021-12-30 PROCEDURE — 99999 PR PBB SHADOW E&M-EST. PATIENT-LVL V: ICD-10-PCS | Mod: PBBFAC,,, | Performed by: NURSE PRACTITIONER

## 2021-12-30 RX ORDER — SIMVASTATIN 20 MG/1
20 TABLET, FILM COATED ORAL NIGHTLY
Qty: 90 TABLET | Refills: 3 | Status: SHIPPED | OUTPATIENT
Start: 2021-12-30 | End: 2024-03-01

## 2021-12-30 NOTE — PATIENT INSTRUCTIONS
Information regarding testing ordered by your provider today:    HOME SLEEP STUDY:  Your provider has ordered a home sleep study. This order has been sent to our billing/pre-service department to be approved by your insurance company. Once the study has been approved (this can take up to 14 business days depending on your insurance), we will call you to schedule an appointment. Once the appointment is scheduled, our Financial Clearance Call Center will be able to provide you with an anticipated out of pocket cost such as a co-payment or unmet deductible amount. The Financial Clearance Call Center can be reached at [phone]. You may also call your insurance company directly and give them the procedure code CPT 96537 to receive an estimate of your out of pocket cost.       If home sleep study is positive:   Plan:   1. Start auto PAP therapy(the order will go to medical equipment and they will contact you after it gets processed through insurance which takes approximately 2 weeks)   2. There is a compliance requirement on machine. The requirement  is minimum 4 hours or more 70% nights (22/30 days) x 1 year or until machine paid off otherwise you risk not having the machine cover by your insurance company.   3. Pick a comfortable mask; but should you have problems with the mask, [facility] (medical equipment) has a 30 day mask exchangepolicy so exchange any mask within 30 days if the mask is uncomfortable. You will need to contact medical equipment to schedule an appointment with them to get another mask fitting. DME [id number] option 1 then option 2  4. We do not actively monitor you.  Please schedule a follow up appointment after using your cpap x 5-6 weeks to determine treatment effectiveness and address problems. This is also a requirement by some insurance in order to meet compliance.     The potential ramifications of untreated sleep apnea  could include hypoxia, daytime sleepiness, dementia, cognitive  impairment, hypertension, heart disease and/or stroke. Potential treatment options, which could include weight loss, body positioning, oral appliances (OA), continuous positive airway pressure (CPAP), or referral for surgical consideration. CPAP is the most effective and least invasive treatment and I would recommend this as a first line treatment.    Behavior modification which includes losing weight, exercising, changing the sleep position, abstaining from alcohol, and avoiding certain medications    Please  abstain from driving should you feel sleepy or drowsy      Please contact us if you have questions, persistent problems or concerns.    Thank you,    Rakel BARTLETT, [facility]  [phone]

## 2021-12-30 NOTE — PROGRESS NOTES
Wendy Cody  was seen as a new patient at the request of Dr. Goldberg, Joana HATCH MD for the evaluation of  possible sleep apnea.    CHIEF COMPLAINT:    Chief Complaint   Patient presents with    Apnea       HISTORY OF PRESENT ILLNESS: Wendy Cody is a 51 y.o. female with DDD lumbaris here for sleep evaluation. Patient with symptoms of  snoring, witnessed apnea, excessive daytime sleepiness, fatigue and difficulty staying asleep .  [employer]  , previous sleep assistant. 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    Patient does have back problems following  pregnancy sometimes get tingling feet, + bruxism, + nightmares    Kermit Sleepiness Scale score 16    SLEEP ROUTINE:  Time to bed:  2100  Sleep onset latency: 5-10 min      Disruptions or awakenings:   2-3 times  Wakeup time:      0530      PAST MEDICAL HISTORY:    Active Ambulatory Problems     Diagnosis Date Noted    Lumbar spondylosis 2021    DDD (degenerative disc disease), lumbar 2021    Lumbar radiculopathy 2021    Sleep-related breathing disorder 2021    Ruptured varicosity 2021     Resolved Ambulatory Problems     Diagnosis Date Noted    No Resolved Ambulatory Problems     Past Medical History:   Diagnosis Date    TBI (traumatic brain injury)                 PAST SURGICAL HISTORY:    Past Surgical History:   Procedure Laterality Date    BUNIONECTOMY      2015     SECTION      , 2017    hand surgery      fracture and ligament and revision    NASAL POLYP EXCISION      TONSILLECTOMY           FAMILY HISTORY:                Family History   Problem Relation Age of Onset    Diabetes type II Mother     Lymphoma Mother     Heart attack Father 38    Hyperlipidemia Father     Ovarian cancer Sister 38    Alzheimer's disease Maternal Grandmother     Alzheimer's disease Paternal Grandmother        SOCIAL  "HISTORY:          Tobacco:   Social History     Tobacco Use   Smoking Status Never Smoker   Smokeless Tobacco Never Used       alcohol use:    Social History     Substance and Sexual Activity   Alcohol Use Not Currently                   ALLERGIES:    Review of patient's allergies indicates:   Allergen Reactions    Bee pollen Swelling     Bee Sting, not pollen per patient    Poultry Nausea And Vomiting       CURRENT MEDICATIONS:    Current Outpatient Medications   Medication Sig Dispense Refill    simvastatin (ZOCOR) 20 MG tablet Take 1 tablet (20 mg total) by mouth every evening. 90 tablet 3     No current facility-administered medications for this visit.                  REVIEW OF SYSTEMS:   Review of Systems   Constitutional: Positive for fatigue. Negative for fever, chills, weight loss, weight gain, activity change, appetite change and night sweats.   HENT: Negative for congestion.    Eyes: Negative.    Respiratory: Positive for apnea, snoring and somnolence. Negative for cough, sputum production, chest tightness, wheezing, orthopnea, previous hospitialization due to pulmonary problems, dyspnea on extertion and use of rescue inhaler.    Cardiovascular: Negative for chest pain and palpitations.   Genitourinary: Negative.    Endocrine: endocrine negative   Musculoskeletal: Positive for back pain. Negative for gait problem.   Skin: Negative.    Gastrointestinal: Negative for acid reflux.   Neurological: Negative.    Hematological:        Varicosity left leg that ruptures periodically and painful with rupture   Psychiatric/Behavioral: Positive for sleep disturbance.        PHYSICAL EXAM:  Vitals:    12/30/21 0818   BP: 126/77   Pulse: 79   Temp: 97.5 °F (36.4 °C)   SpO2: 96%   Weight: 101.1 kg (222 lb 15.9 oz)   Height: 5' 7" (1.702 m)   PainSc: 0-No pain     Body mass index is 34.93 kg/m².   Physical Exam   Constitutional: She is oriented to person, place, and time. She appears well-developed.   HENT:   Head: " Normocephalic.   Nose: Nose normal.   Mouth/Throat: Oropharynx is clear and moist. Mallampati Score: IV.   Cardiovascular: Normal rate, regular rhythm and normal heart sounds.   Pulmonary/Chest: Normal expansion, effort normal and breath sounds normal.   Musculoskeletal:         General: Edema (trace ) present.      Cervical back: Neck supple.      Comments: Isolated varicosity left calf   Neurological: She is alert and oriented to person, place, and time. Gait normal.   Skin: Skin is warm.   Psychiatric: She has a normal mood and affect. Her behavior is normal. Judgment and thought content normal.                                               DATA:  TSH:  Lab Results   Component Value Date    TSH 3.750 12/21/2021     CBC:  Lab Results   Component Value Date    WBC 5.69 12/21/2021    HGB 13.7 12/21/2021    HCT 41.9 12/21/2021    MCV 89 12/21/2021     12/21/2021     BMP:  Lab Results   Component Value Date     12/21/2021    K 4.5 12/21/2021     12/21/2021    CO2 28 12/21/2021    BUN 13 12/21/2021    CREATININE 0.9 12/21/2021    CALCIUM 8.9 12/21/2021    ANIONGAP 9 12/21/2021    ESTGFRAFRICA >60 12/21/2021    EGFRNONAA >60 12/21/2021     HgbA1C:  No results found for: LABA1C, HGBA1C         ASSESSMENT    ICD-10-CM ICD-9-CM    1. Sleep-related breathing disorder  G47.30 780.59 Ambulatory referral/consult to Sleep Disorders      Home Sleep Studies   2. Ruptured varicosity  I83.899 454.8 Ambulatory referral/consult to Vascular Surgery       PLAN:    Sleep Breathing Disorder. The patient symptomatically has snoring, witnessed apnea, fatigue, EDS  with findings of  obesity. This warrants further investigation for possible obstructive sleep apnea.  Patient will be contacted after sleep study is done.  Pt would like to start cpap if sleep study cofirms rosalio.      Diagnostic: HST. The nature of this procedure and its indication was discussed with the patient.     Education: During our discussion today, we  talked about the etiology of obstructive sleep apnea as well as the potential ramifications of untreated sleep apnea, which could include daytime sleepiness, hypertension, heart disease and/or stroke.     Precautions: The patient was advised to abstain from driving should they feel sleepy or drowsy.     Varicosity with recurrent rupture- referral vascular    Thank you for allowing me the opportunity to participate in the care of your patient.    Patient will Follow up for 5-6 weeks following cpap usage, please bring cpap.    Please cc note to  Joana Ochoa MD.

## 2022-01-13 ENCOUNTER — HOSPITAL ENCOUNTER (OUTPATIENT)
Dept: SLEEP MEDICINE | Facility: HOSPITAL | Age: 52
Discharge: HOME | End: 2022-01-13
Attending: NURSE PRACTITIONER
Payer: COMMERCIAL

## 2022-01-13 DIAGNOSIS — G47.30 SLEEP-RELATED BREATHING DISORDER: ICD-10-CM

## 2022-01-13 PROCEDURE — 95806 SLEEP STUDY UNATT&RESP EFFT: CPT | Mod: 26,,, | Performed by: INTERNAL MEDICINE

## 2022-01-13 PROCEDURE — 95800 SLP STDY UNATTENDED: CPT

## 2022-01-13 PROCEDURE — 95806 PR SLEEP STUDY, UNATTENDED, SIMUL RECORD HR/O2 SAT/RESP FLOW/RESP EFFT: ICD-10-PCS | Mod: 26,,, | Performed by: INTERNAL MEDICINE

## 2022-01-13 NOTE — PROGRESS NOTES
The patient ID was verified.  She was instructed on how to turn the Home Sleep Testing device on and off, how to apply the sensors.  She was encouraged to sleep on supine position and must have 6 hours of sleep. The patient was instructed not to get the device wet and return it back to us. All questions were answered prior to patient leaving.  She was provided the after visit summary.

## 2022-01-14 NOTE — PROCEDURES
"Dear Provider,     You have ordered sleep LAB services to perform the sleep study for Wendy Cody.  The sleep study that you ordered is complete.      Please find Sleep Study result in "Chart Review" under the "Media tab."      As the ordering provider, you are responsible for reviewing the results and implementing a treatment plan with your patient.    If you need a Sleep Medicine provider to explain the sleep study findings and arrange treatment for the patient, please refer patient for consultation to our Sleep Clinic via [facility] with Ambulatory Consult Sleep.    To do that please place an order for an  "Ambulatory Consult Sleep" - it will go to our clinic work queue for our Medical Assistant to contact the patient for an appointment.     For any questions, please contact our clinic staff at [phone] to talk to clinical staff.   "

## 2022-01-14 NOTE — PROGRESS NOTES
The HSAT device was received and uploaded this morning. The recorded sleep data noted to be adequate and the patient did not report issue with the device during the study.

## 2022-01-21 ENCOUNTER — PATIENT MESSAGE (OUTPATIENT)
Dept: PULMONOLOGY | Facility: CLINIC | Age: 52
End: 2022-01-21
Payer: COMMERCIAL

## 2022-01-21 DIAGNOSIS — G47.33 OSA (OBSTRUCTIVE SLEEP APNEA): Primary | ICD-10-CM

## 2022-02-02 ENCOUNTER — INITIAL CONSULT (OUTPATIENT)
Dept: VASCULAR SURGERY | Facility: CLINIC | Age: 52
End: 2022-02-02
Payer: COMMERCIAL

## 2022-02-02 VITALS
BODY MASS INDEX: 34.67 KG/M2 | DIASTOLIC BLOOD PRESSURE: 64 MMHG | SYSTOLIC BLOOD PRESSURE: 124 MMHG | WEIGHT: 221.31 LBS

## 2022-02-02 DIAGNOSIS — I83.899 RUPTURED VARICOSITY: ICD-10-CM

## 2022-02-02 PROCEDURE — 99203 OFFICE O/P NEW LOW 30 MIN: CPT | Mod: S$GLB,,, | Performed by: SURGERY

## 2022-02-02 PROCEDURE — 99999 PR PBB SHADOW E&M-EST. PATIENT-LVL III: CPT | Mod: PBBFAC,,, | Performed by: SURGERY

## 2022-02-02 PROCEDURE — 99203 PR OFFICE/OUTPT VISIT, NEW, LEVL III, 30-44 MIN: ICD-10-PCS | Mod: S$GLB,,, | Performed by: SURGERY

## 2022-02-02 PROCEDURE — 99999 PR PBB SHADOW E&M-EST. PATIENT-LVL III: ICD-10-PCS | Mod: PBBFAC,,, | Performed by: SURGERY

## 2022-02-02 NOTE — PROGRESS NOTES
Rosalio Sanderson MD, RPVI                                 [facility]                           [facility]                             2022    HPI:  Wendy Cody is a 51 y.o. female with   Patient Active Problem List   Diagnosis    Lumbar spondylosis    DDD (degenerative disc disease), lumbar    Lumbar radiculopathy    Sleep-related breathing disorder    Ruptured varicosity    being managed by PCP and specialists who is here today for evaluation of LLE skin lesion.  Concern for varicosity.  States it ruptures and she develops a large skin bruise.  Patient states location is L medial calf occurring for yrs.  Associated signs and symptoms include discoloration.  Quality is dull and severity is 2/10.  Symptoms began yrs ago.  Alleviating factors include elevation.  Worsening factors include dependency.  Patient is not wearing compression stockings daily.  No FH of venous disease.  Symptoms do not limit patient's functional status and daily activities.  no DVT history.  no venous interventions.  no low sodium diet.  no excessive water intake.    Migraine with aura: no  PFO/ASD/right to left shunt: no  Pregnant: no  Breastfeeding: no    no MI  no Stroke  Tobacco use: no    Past Medical History:   Diagnosis Date    TBI (traumatic brain injury)      Past Surgical History:   Procedure Laterality Date    BUNIONECTOMY      2015     SECTION      , 2017    hand surgery      fracture and ligament and revision    NASAL POLYP EXCISION      TONSILLECTOMY       Family History   Problem Relation Age of Onset    Diabetes type II Mother     Lymphoma Mother     Heart attack Father 38    Hyperlipidemia Father     Ovarian cancer Sister 38    Alzheimer's disease Maternal Grandmother     Alzheimer's disease Paternal Grandmother      Social History     Socioeconomic History    Marital status:     Number of children: 2   Occupational History     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   Tobacco Use    Smoking status: Never Smoker    Smokeless tobacco: Never Used   Substance and Sexual Activity    Alcohol use: Not Currently       Current Outpatient Medications:     simvastatin (ZOCOR) 20 MG tablet, Take 1 tablet (20 mg total) by mouth every evening., Disp: 90 tablet, Rfl: 3    REVIEW OF SYSTEMS:  General: No fevers or chills; ENT: No sore throat; Allergy and Immunology: no persistent infections; Hematological and Lymphatic: No history of bleeding or easy bruising; Endocrine: negative; Respiratory: no cough, shortness of breath, or wheezing; Cardiovascular: no chest pain or dyspnea on exertion; Gastrointestinal: no abdominal pain/back, change in bowel habits, or bloody stools; Genito-Urinary: no dysuria, trouble voiding, or hematuria; Musculoskeletal: edema; Neurological: no TIA or stroke symptoms; Psychiatric: no nervousness, anxiety or depression.    PHYSICAL EXAM:                General appearance:  Alert, well-appearing, and in no distress.  Oriented to person, place, and time                    Neurological: Normal speech, no focal findings noted; CN II - XII grossly intact. RLE with sensation to light touch, LLE with sensation to light touch.            Musculoskeletal: Digits/nail without cyanosis/clubbing.  Strength 5/5 BLE.                    Neck: Supple, no significant adenopathy                  Chest:  No wheezes, symmetric air entry. No use of accessory muscles               Cardiac: Normal rate and regular rhythm            Abdomen: Soft, nontender, nondistended      Extremities:   2+ R DP pulse, 2+ L DP pulse      1+ RLE edema, 1+ LLE edema    Skin:  RLE no ulcer; LLE no ulcer      RLE no spider veins, LLE no spider veins      RLE no varicose veins, LLE no varicose veins    CEAP 3/3    VCSS 14    LAB RESULTS:  No results found for: CBC  No results found for: LABPROT, INR  Lab Results   Component Value Date      12/21/2021    K 4.5 12/21/2021     12/21/2021    CO2 28 12/21/2021    GLU 80 12/21/2021    BUN 13 12/21/2021    CREATININE 0.9 12/21/2021    CALCIUM 8.9 12/21/2021    ANIONGAP 9 12/21/2021    EGFRNONAA >60 12/21/2021     Lab Results   Component Value Date    WBC 5.69 12/21/2021    RBC 4.71 12/21/2021    HGB 13.7 12/21/2021    HCT 41.9 12/21/2021    MCV 89 12/21/2021    MCH 29.1 12/21/2021    MCHC 32.7 12/21/2021    RDW 12.3 12/21/2021     12/21/2021    MPV 10.8 12/21/2021    GRAN 3.3 12/21/2021    GRAN 58.6 12/21/2021    LYMPH 1.6 12/21/2021    LYMPH 28.5 12/21/2021    MONO 0.5 12/21/2021    MONO 9.5 12/21/2021    EOS 0.1 12/21/2021    BASO 0.05 12/21/2021    EOSINOPHIL 2.3 12/21/2021    BASOPHIL 0.9 12/21/2021    DIFFMETHOD Automated 12/21/2021     .No results found for: HGBA1C    IMAGING:  All pertinent imaging has been reviewed and interpreted independently.    IMP/PLAN:  51 y.o. female with   Patient Active Problem List   Diagnosis    Lumbar spondylosis    DDD (degenerative disc disease), lumbar    Lumbar radiculopathy    Sleep-related breathing disorder    Ruptured varicosity    being managed by PCP and specialists who is here today for evaluation of LLE lesion.    -Concern for cystic etiology - will obtain venous insuff US and soft tissue US to eval lesion further  -recommend compression with Rx stockings, elevation, dietary changes associated with water and sodium intake discussed at length with patient  -Exercise   -RTC after US    I spent 12 minutes evaluating this patient and greater than 50% of the time was spent counseling, coordinator care and discussing the plan of care.  All questions were answered and patient stated understanding with agreement with the above treatment plan.    Rosalio Sanderson MD [facility]  Vascular and Endovascular Surgery

## 2022-02-03 ENCOUNTER — PATIENT MESSAGE (OUTPATIENT)
Dept: PULMONOLOGY | Facility: CLINIC | Age: 52
End: 2022-02-03
Payer: COMMERCIAL

## 2022-02-17 ENCOUNTER — HOSPITAL ENCOUNTER (OUTPATIENT)
Dept: RADIOLOGY | Facility: HOSPITAL | Age: 52
Discharge: HOME | End: 2022-02-17
Attending: SURGERY
Payer: COMMERCIAL

## 2022-02-17 ENCOUNTER — HOSPITAL ENCOUNTER (OUTPATIENT)
Dept: CARDIOLOGY | Facility: HOSPITAL | Age: 52
Discharge: HOME | End: 2022-02-17
Attending: SURGERY
Payer: COMMERCIAL

## 2022-02-17 DIAGNOSIS — I83.899 RUPTURED VARICOSITY: ICD-10-CM

## 2022-02-17 PROCEDURE — 76882 US LMTD JT/FCL EVL NVASC XTR: CPT | Mod: 26,LT,, | Performed by: RADIOLOGY

## 2022-02-17 PROCEDURE — 93970 EXTREMITY STUDY: CPT | Mod: TC

## 2022-02-17 PROCEDURE — 93970 EXTREMITY STUDY: CPT | Mod: 26,,, | Performed by: SURGERY

## 2022-02-17 PROCEDURE — 76882 US SOFT TISSUE, LOWER EXTREMITY, LEFT: ICD-10-PCS | Mod: 26,LT,, | Performed by: RADIOLOGY

## 2022-02-17 PROCEDURE — 76882 US LMTD JT/FCL EVL NVASC XTR: CPT | Mod: TC,LT

## 2022-02-17 PROCEDURE — 93970 CV US LOWER VENOUS INSUFFICIENCY BILATERAL (CUPID ONLY): ICD-10-PCS | Mod: 26,,, | Performed by: SURGERY

## 2022-02-18 LAB
LEFT GREAT SAPHENOUS DISTAL THIGH DIA: 0.4 CM
LEFT GREAT SAPHENOUS JUNCTION DIA: 0.6 CM
LEFT GREAT SAPHENOUS KNEE DIA: 0.4 CM
LEFT GREAT SAPHENOUS MIDDLE THIGH DIA: 0.4 CM
LEFT GREAT SAPHENOUS PROXIMAL CALF DIA: 0.3 CM
LEFT SMALL SAPHENOUS KNEE DIA: 0.3 CM
LEFT SMALL SAPHENOUS SPJ DIA: 0.3 CM
RIGHT GREAT SAPHENOUS DISTAL THIGH DIA: 0.4 CM
RIGHT GREAT SAPHENOUS JUNCTION DIA: 0.8 CM
RIGHT GREAT SAPHENOUS KNEE DIA: 0.3 CM
RIGHT GREAT SAPHENOUS MIDDLE THIGH DIA: 0.4 CM
RIGHT GREAT SAPHENOUS PROXIMAL CALF DIA: 0.5 CM
RIGHT SMALL SAPHENOUS KNEE DIA: 0.2 CM
RIGHT SMALL SAPHENOUS SPJ DIA: 0.2 CM

## 2022-03-22 ENCOUNTER — PATIENT MESSAGE (OUTPATIENT)
Dept: ADMINISTRATIVE | Facility: HOSPITAL | Age: 52
End: 2022-03-22
Payer: COMMERCIAL

## 2022-03-22 DIAGNOSIS — Z12.11 SCREENING FOR COLON CANCER: ICD-10-CM

## 2022-04-13 DIAGNOSIS — Z12.11 COLON CANCER SCREENING: ICD-10-CM

## 2022-04-28 ENCOUNTER — PATIENT OUTREACH (OUTPATIENT)
Dept: ADMINISTRATIVE | Facility: HOSPITAL | Age: 52
End: 2022-04-28
Payer: COMMERCIAL

## 2022-04-28 ENCOUNTER — TELEPHONE (OUTPATIENT)
Dept: ADMINISTRATIVE | Facility: HOSPITAL | Age: 52
End: 2022-04-28
Payer: COMMERCIAL

## 2022-05-02 ENCOUNTER — PATIENT MESSAGE (OUTPATIENT)
Dept: ADMINISTRATIVE | Facility: HOSPITAL | Age: 52
End: 2022-05-02
Payer: COMMERCIAL

## 2022-05-12 ENCOUNTER — PATIENT OUTREACH (OUTPATIENT)
Dept: ADMINISTRATIVE | Facility: OTHER | Age: 52
End: 2022-05-12
Payer: COMMERCIAL

## 2022-05-13 ENCOUNTER — OFFICE VISIT (OUTPATIENT)
Dept: PULMONOLOGY | Facility: CLINIC | Age: 52
End: 2022-05-13
Payer: COMMERCIAL

## 2022-05-13 VITALS
SYSTOLIC BLOOD PRESSURE: 129 MMHG | HEIGHT: 67 IN | BODY MASS INDEX: 32.89 KG/M2 | WEIGHT: 209.56 LBS | OXYGEN SATURATION: 97 % | HEART RATE: 66 BPM | DIASTOLIC BLOOD PRESSURE: 86 MMHG

## 2022-05-13 DIAGNOSIS — G47.33 OSA ON CPAP: Primary | ICD-10-CM

## 2022-05-13 PROCEDURE — 1160F RVW MEDS BY RX/DR IN RCRD: CPT | Mod: CPTII,S$GLB,, | Performed by: NURSE PRACTITIONER

## 2022-05-13 PROCEDURE — 99213 PR OFFICE/OUTPT VISIT, EST, LEVL III, 20-29 MIN: ICD-10-PCS | Mod: S$GLB,,, | Performed by: NURSE PRACTITIONER

## 2022-05-13 PROCEDURE — 99999 PR PBB SHADOW E&M-EST. PATIENT-LVL III: ICD-10-PCS | Mod: PBBFAC,,, | Performed by: NURSE PRACTITIONER

## 2022-05-13 PROCEDURE — 99999 PR PBB SHADOW E&M-EST. PATIENT-LVL III: CPT | Mod: PBBFAC,,, | Performed by: NURSE PRACTITIONER

## 2022-05-13 PROCEDURE — 3079F DIAST BP 80-89 MM HG: CPT | Mod: CPTII,S$GLB,, | Performed by: NURSE PRACTITIONER

## 2022-05-13 PROCEDURE — 1160F PR REVIEW ALL MEDS BY PRESCRIBER/CLIN PHARMACIST DOCUMENTED: ICD-10-PCS | Mod: CPTII,S$GLB,, | Performed by: NURSE PRACTITIONER

## 2022-05-13 PROCEDURE — 1159F MED LIST DOCD IN RCRD: CPT | Mod: CPTII,S$GLB,, | Performed by: NURSE PRACTITIONER

## 2022-05-13 PROCEDURE — 3079F PR MOST RECENT DIASTOLIC BLOOD PRESSURE 80-89 MM HG: ICD-10-PCS | Mod: CPTII,S$GLB,, | Performed by: NURSE PRACTITIONER

## 2022-05-13 PROCEDURE — 3074F SYST BP LT 130 MM HG: CPT | Mod: CPTII,S$GLB,, | Performed by: NURSE PRACTITIONER

## 2022-05-13 PROCEDURE — 99213 OFFICE O/P EST LOW 20 MIN: CPT | Mod: S$GLB,,, | Performed by: NURSE PRACTITIONER

## 2022-05-13 PROCEDURE — 3008F PR BODY MASS INDEX (BMI) DOCUMENTED: ICD-10-PCS | Mod: CPTII,S$GLB,, | Performed by: NURSE PRACTITIONER

## 2022-05-13 PROCEDURE — 3008F BODY MASS INDEX DOCD: CPT | Mod: CPTII,S$GLB,, | Performed by: NURSE PRACTITIONER

## 2022-05-13 PROCEDURE — 3074F PR MOST RECENT SYSTOLIC BLOOD PRESSURE < 130 MM HG: ICD-10-PCS | Mod: CPTII,S$GLB,, | Performed by: NURSE PRACTITIONER

## 2022-05-13 PROCEDURE — 1159F PR MEDICATION LIST DOCUMENTED IN MEDICAL RECORD: ICD-10-PCS | Mod: CPTII,S$GLB,, | Performed by: NURSE PRACTITIONER

## 2022-05-13 NOTE — PROGRESS NOTES
CHIEF COMPLAINT:    Chief Complaint   Patient presents with    Apnea       HISTORY OF PRESENT ILLNESS: Wendy Cody is a 51 y.o. female with DDD lumbaris here for sleep evaluation. Patient with symptoms of  snoring, witnessed apnea, excessive daytime sleepiness, fatigue and difficulty staying asleep .  [employer] , previous sleep assistant. 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    Patient does have back problems following  pregnancy sometimes get tingling feet, + bruxism, + nightmares    [place] Sleepiness Scale score 16    SLEEP ROUTINE:  Time to bed:  2100  Sleep onset latency: 5-10 min      Disruptions or awakenings:   2-3 times  Wakeup time:      0530    Hst 2022: AHI 7  Pt on Kimberley G 6-11 reports resolution daytime sleepiness ESS 0, increase energy, sleeping better  Review of cpap usage > 4 5 hours av p 95 6.5 ahi 0.3 MAYUR 0.1 4l/min 90% compliant        PAST MEDICAL HISTORY:    Active Ambulatory Problems     Diagnosis Date Noted    Lumbar spondylosis 2021    DDD (degenerative disc disease), lumbar 2021    Lumbar radiculopathy 2021    Sleep-related breathing disorder 2021    Ruptured varicosity 2021    MOUNA on CPAP 2022     Resolved Ambulatory Problems     Diagnosis Date Noted    No Resolved Ambulatory Problems     Past Medical History:   Diagnosis Date    TBI (traumatic brain injury)                 PAST SURGICAL HISTORY:    Past Surgical History:   Procedure Laterality Date    BUNIONECTOMY      2015     SECTION      , 2017    hand surgery      fracture and ligament and revision    NASAL POLYP EXCISION      TONSILLECTOMY           FAMILY HISTORY:                Family History   Problem Relation Age of Onset    Diabetes type II Mother     Lymphoma Mother     Heart attack Father 38    Hyperlipidemia Father     Ovarian cancer Sister  "38    Alzheimer's disease Maternal Grandmother     Alzheimer's disease Paternal Grandmother        SOCIAL HISTORY:          Tobacco:   Social History     Tobacco Use   Smoking Status Never Smoker   Smokeless Tobacco Never Used       alcohol use:    Social History     Substance and Sexual Activity   Alcohol Use Not Currently                   ALLERGIES:    Review of patient's allergies indicates:   Allergen Reactions    Bee pollen Swelling     Bee Sting, not pollen per patient    Poultry Nausea And Vomiting       CURRENT MEDICATIONS:    Current Outpatient Medications   Medication Sig Dispense Refill    simvastatin (ZOCOR) 20 MG tablet Take 1 tablet (20 mg total) by mouth every evening. 90 tablet 3     No current facility-administered medications for this visit.                  REVIEW OF SYSTEMS:   Review of Systems   Constitutional: Positive for fatigue. Negative for fever, chills, weight loss, weight gain, activity change, appetite change and night sweats.   HENT: Negative for congestion.    Eyes: Negative.    Respiratory: Positive for apnea, snoring and somnolence. Negative for cough, sputum production, chest tightness, wheezing, orthopnea, previous hospitialization due to pulmonary problems, dyspnea on extertion and use of rescue inhaler.    Cardiovascular: Negative for chest pain and palpitations.   Genitourinary: Negative.    Endocrine: endocrine negative   Musculoskeletal: Positive for back pain. Negative for gait problem.   Skin: Negative.    Gastrointestinal: Negative for acid reflux.   Neurological: Negative.    Hematological:        Varicosity left leg that ruptures periodically and painful with rupture   Psychiatric/Behavioral: Positive for sleep disturbance.        PHYSICAL EXAM:  Vitals:    05/13/22 1410   BP: 129/86   Pulse: 66   SpO2: 97%   Weight: 95.1 kg (209 lb 8.8 oz)   Height: 5' 7" (1.702 m)   PainSc: 0-No pain     Body mass index is 32.82 kg/m².   Physical Exam   Constitutional: She is " oriented to person, place, and time. She appears well-developed.   HENT:   Head: Normocephalic.   Nose: Nose normal.   Mouth/Throat: Oropharynx is clear and moist. Mallampati Score: IV.   Cardiovascular: Normal rate, regular rhythm and normal heart sounds.   Pulmonary/Chest: Normal expansion, effort normal and breath sounds normal.   Musculoskeletal:         General: Edema (trace ) present.      Cervical back: Neck supple.      Comments: Isolated varicosity left calf   Neurological: She is alert and oriented to person, place, and time. Gait normal.   Skin: Skin is warm.   Psychiatric: She has a normal mood and affect. Her behavior is normal. Judgment and thought content normal.                                               DATA:  TSH:  Lab Results   Component Value Date    TSH 3.750 12/21/2021     CBC:  Lab Results   Component Value Date    WBC 5.69 12/21/2021    HGB 13.7 12/21/2021    HCT 41.9 12/21/2021    MCV 89 12/21/2021     12/21/2021     BMP:  Lab Results   Component Value Date     12/21/2021    K 4.5 12/21/2021     12/21/2021    CO2 28 12/21/2021    BUN 13 12/21/2021    CREATININE 0.9 12/21/2021    CALCIUM 8.9 12/21/2021    ANIONGAP 9 12/21/2021    ESTGFRAFRICA >60 12/21/2021    EGFRNONAA >60 12/21/2021     HgbA1C:  No results found for: LABA1C, HGBA1C         ASSESSMENT    ICD-10-CM ICD-9-CM    1. MOUNA on CPAP  G47.33 327.23     Z99.89 V46.8        PLAN:    Problem List Items Addressed This Visit        Unprioritized    MOUNA on CPAP - Primary    Overview     Hst 1/13/2022: AHI 7    Patient is using and benefiting from cpap. Residual predicted AHI optimal.                Patient will Follow up in about 1 year (around 5/13/2023), or if symptoms worsen or fail to improve.

## 2022-05-13 NOTE — PROGRESS NOTES
Health Maintenance Due   Topic Date Due    HIV Screening  Never done    Colorectal Cancer Screening  Never done    COVID-19 Vaccine (3 - Booster for Moderna series) 09/12/2021     Updates were requested from care everywhere.  Chart was reviewed for overdue Proactive [facility] ([facility]) topics (CRS, Breast Cancer Screening, Eye exam)  Health Maintenance has been updated.  LINKS immunization registry triggered.  Immunizations were reconciled.\

## 2022-05-26 LAB — HEMOCCULT STL QL IA: NORMAL

## 2022-06-03 ENCOUNTER — PATIENT MESSAGE (OUTPATIENT)
Dept: PULMONOLOGY | Facility: CLINIC | Age: 52
End: 2022-06-03
Payer: COMMERCIAL

## 2022-06-07 ENCOUNTER — PATIENT MESSAGE (OUTPATIENT)
Dept: FAMILY MEDICINE | Facility: CLINIC | Age: 52
End: 2022-06-07
Payer: COMMERCIAL

## 2022-06-07 DIAGNOSIS — Z12.11 COLON CANCER SCREENING: Primary | ICD-10-CM

## 2022-06-07 NOTE — TELEPHONE ENCOUNTER
Her fit kit order was not processed. We can redo that or wait until she is 52 and due her colonoscopy.

## 2022-06-15 ENCOUNTER — PATIENT OUTREACH (OUTPATIENT)
Dept: ADMINISTRATIVE | Facility: HOSPITAL | Age: 52
End: 2022-06-15
Payer: COMMERCIAL

## 2022-06-15 ENCOUNTER — TELEPHONE (OUTPATIENT)
Dept: ADMINISTRATIVE | Facility: HOSPITAL | Age: 52
End: 2022-06-15
Payer: COMMERCIAL

## 2023-01-11 DIAGNOSIS — Z12.31 OTHER SCREENING MAMMOGRAM: ICD-10-CM

## 2023-05-11 ENCOUNTER — OFFICE VISIT (OUTPATIENT)
Dept: FAMILY MEDICINE | Facility: CLINIC | Age: 53
End: 2023-05-11
Payer: COMMERCIAL

## 2023-05-11 VITALS
HEART RATE: 68 BPM | SYSTOLIC BLOOD PRESSURE: 124 MMHG | HEIGHT: 67 IN | BODY MASS INDEX: 32.87 KG/M2 | TEMPERATURE: 98 F | WEIGHT: 209.44 LBS | DIASTOLIC BLOOD PRESSURE: 86 MMHG | OXYGEN SATURATION: 95 %

## 2023-05-11 DIAGNOSIS — Z12.11 COLON CANCER SCREENING: ICD-10-CM

## 2023-05-11 DIAGNOSIS — Z00.00 ANNUAL PHYSICAL EXAM: Primary | ICD-10-CM

## 2023-05-11 PROCEDURE — 99999 PR PBB SHADOW E&M-EST. PATIENT-LVL IV: CPT | Mod: PBBFAC,,, | Performed by: FAMILY MEDICINE

## 2023-05-11 PROCEDURE — 99396 PREV VISIT EST AGE 40-64: CPT | Mod: S$GLB,,, | Performed by: FAMILY MEDICINE

## 2023-05-11 PROCEDURE — 99999 PR PBB SHADOW E&M-EST. PATIENT-LVL IV: ICD-10-PCS | Mod: PBBFAC,,, | Performed by: FAMILY MEDICINE

## 2023-05-11 PROCEDURE — 1159F MED LIST DOCD IN RCRD: CPT | Mod: CPTII,S$GLB,, | Performed by: FAMILY MEDICINE

## 2023-05-11 PROCEDURE — 3008F BODY MASS INDEX DOCD: CPT | Mod: CPTII,S$GLB,, | Performed by: FAMILY MEDICINE

## 2023-05-11 PROCEDURE — 3074F PR MOST RECENT SYSTOLIC BLOOD PRESSURE < 130 MM HG: ICD-10-PCS | Mod: CPTII,S$GLB,, | Performed by: FAMILY MEDICINE

## 2023-05-11 PROCEDURE — 3079F DIAST BP 80-89 MM HG: CPT | Mod: CPTII,S$GLB,, | Performed by: FAMILY MEDICINE

## 2023-05-11 PROCEDURE — 3008F PR BODY MASS INDEX (BMI) DOCUMENTED: ICD-10-PCS | Mod: CPTII,S$GLB,, | Performed by: FAMILY MEDICINE

## 2023-05-11 PROCEDURE — 99396 PR PREVENTIVE VISIT,EST,40-64: ICD-10-PCS | Mod: S$GLB,,, | Performed by: FAMILY MEDICINE

## 2023-05-11 PROCEDURE — 3079F PR MOST RECENT DIASTOLIC BLOOD PRESSURE 80-89 MM HG: ICD-10-PCS | Mod: CPTII,S$GLB,, | Performed by: FAMILY MEDICINE

## 2023-05-11 PROCEDURE — 3074F SYST BP LT 130 MM HG: CPT | Mod: CPTII,S$GLB,, | Performed by: FAMILY MEDICINE

## 2023-05-11 PROCEDURE — 1159F PR MEDICATION LIST DOCUMENTED IN MEDICAL RECORD: ICD-10-PCS | Mod: CPTII,S$GLB,, | Performed by: FAMILY MEDICINE

## 2023-05-11 NOTE — PROGRESS NOTES
Subjective     Patient ID: Wendy Cody is a 52 y.o. female.    Chief Complaint: Annual Exam    52 yfea rold here for an annual exam. She is doing well. Her cycles are spacing out now to every 36 days.  She states the distance between them are getting longer. She is exercising.         Past Medical History:   Diagnosis Date    TBI (traumatic brain injury)       Past Surgical History:   Procedure Laterality Date    BUNIONECTOMY      2015     SECTION      , 2017    hand surgery      fracture and ligament and revision    NASAL POLYP EXCISION      TONSILLECTOMY       Family History   Problem Relation Age of Onset    Diabetes type II Mother     Lymphoma Mother     Heart attack Father 38    Hyperlipidemia Father     Ovarian cancer Sister 38    Alzheimer's disease Maternal Grandmother     Alzheimer's disease Paternal Grandmother      Social History     Socioeconomic History    Marital status:     Number of children: 2   Occupational History     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   HENT:  Negative for nasal congestion, ear pain, postnasal drip, rhinorrhea, sneezing and sore throat.    Eyes:  Negative for visual disturbance.   Respiratory:  Negative for cough, chest tightness, shortness of breath and wheezing.    Cardiovascular:  Negative for chest pain, palpitations and leg swelling.   Gastrointestinal:  Negative for abdominal pain, blood in stool, constipation, diarrhea, nausea and vomiting.   Genitourinary:  Negative for dysuria, frequency, hematuria, menstrual problem and urgency.   Musculoskeletal:  Negative for arthralgias and joint swelling.   Integumentary:  Negative for rash.   Neurological:  Negative for dizziness, syncope,  "light-headedness, numbness and headaches.        Objective     Vitals:    05/11/23 1546 05/11/23 1609   BP: (!) 134/94 124/86   Pulse: 68    Temp: 98.2 °F (36.8 °C)    TempSrc: Oral    SpO2: 95%    Weight: 95 kg (209 lb 7 oz)    Height: 5' 7" (1.702 m)        Physical Exam  Constitutional:       General: She is not in acute distress.     Appearance: Normal appearance. She is well-developed. She is not ill-appearing, toxic-appearing or diaphoretic.   HENT:      Head: Normocephalic and atraumatic.      Right Ear: External ear normal.      Left Ear: External ear normal.      Mouth/Throat:      Pharynx: No oropharyngeal exudate.   Eyes:      General: No scleral icterus.     Conjunctiva/sclera: Conjunctivae normal.      Pupils: Pupils are equal, round, and reactive to light.   Neck:      Thyroid: No thyromegaly.      Vascular: No carotid bruit or JVD.   Cardiovascular:      Rate and Rhythm: Normal rate and regular rhythm.      Heart sounds: Normal heart sounds. No murmur heard.    No friction rub. No gallop.   Pulmonary:      Effort: Pulmonary effort is normal. No respiratory distress.      Breath sounds: Normal breath sounds. No stridor. No wheezing, rhonchi or rales.   Abdominal:      General: Bowel sounds are normal. There is no distension.      Palpations: Abdomen is soft. There is no mass.      Tenderness: There is no abdominal tenderness. There is no guarding or rebound.      Hernia: No hernia is present.   Genitourinary:     Labia:         Right: No rash or lesion.         Left: No rash or lesion.       Vagina: No signs of injury. No bleeding.      Cervix: No cervical motion tenderness, discharge or friability.      Rectum: Normal. No external hemorrhoid or internal hemorrhoid. Normal anal tone.   Musculoskeletal:         General: No swelling.      Cervical back: Normal range of motion and neck supple.      Comments: Normal gait   Lymphadenopathy:      Cervical: No cervical adenopathy.   Skin:     Findings: No " rash.   Neurological:      Mental Status: She is alert and oriented to person, place, and time.   Psychiatric:         Mood and Affect: Mood normal.         Behavior: Behavior normal.          Assessment and Plan     Problem List Items Addressed This Visit    None  Visit Diagnoses       Annual physical exam    -  Primary    Relevant Orders    CBC Auto Differential    Comprehensive Metabolic Panel    Lipid Panel    TSH    Colon cancer screening        Relevant Orders    Fecal Immunochemical Test (iFOBT)            Wendy was seen today for annual exam.    Diagnoses and all orders for this visit:    Annual physical exam  -     CBC Auto Differential; Future  -     Comprehensive Metabolic Panel; Future  -     Lipid Panel; Future  -     TSH; Future    Colon cancer screening  -     Fecal Immunochemical Test (iFOBT); Future

## 2023-05-12 ENCOUNTER — HOSPITAL ENCOUNTER (OUTPATIENT)
Dept: RADIOLOGY | Facility: HOSPITAL | Age: 53
Discharge: HOME | End: 2023-05-12
Attending: FAMILY MEDICINE
Payer: COMMERCIAL

## 2023-05-12 DIAGNOSIS — Z12.31 OTHER SCREENING MAMMOGRAM: ICD-10-CM

## 2023-05-12 PROCEDURE — 77067 SCR MAMMO BI INCL CAD: CPT | Mod: 26,,, | Performed by: RADIOLOGY

## 2023-05-12 PROCEDURE — 77063 MAMMO DIGITAL SCREENING BILAT WITH TOMO: ICD-10-PCS | Mod: 26,,, | Performed by: RADIOLOGY

## 2023-05-12 PROCEDURE — 77067 SCR MAMMO BI INCL CAD: CPT | Mod: TC

## 2023-05-12 PROCEDURE — 77067 MAMMO DIGITAL SCREENING BILAT WITH TOMO: ICD-10-PCS | Mod: 26,,, | Performed by: RADIOLOGY

## 2023-05-12 PROCEDURE — 77063 BREAST TOMOSYNTHESIS BI: CPT | Mod: 26,,, | Performed by: RADIOLOGY

## 2023-05-15 ENCOUNTER — LAB VISIT (OUTPATIENT)
Dept: LAB | Facility: HOSPITAL | Age: 53
End: 2023-05-15
Attending: FAMILY MEDICINE
Payer: COMMERCIAL

## 2023-05-15 ENCOUNTER — PATIENT MESSAGE (OUTPATIENT)
Dept: ADMINISTRATIVE | Facility: HOSPITAL | Age: 53
End: 2023-05-15
Payer: COMMERCIAL

## 2023-05-15 DIAGNOSIS — Z00.00 ANNUAL PHYSICAL EXAM: ICD-10-CM

## 2023-05-15 LAB
ALBUMIN SERPL BCP-MCNC: 3.5 G/DL (ref 3.5–5.2)
ALP SERPL-CCNC: 44 U/L (ref 55–135)
ALT SERPL W/O P-5'-P-CCNC: 13 U/L (ref 10–44)
ANION GAP SERPL CALC-SCNC: 5 MMOL/L (ref 8–16)
AST SERPL-CCNC: 16 U/L (ref 10–40)
BASOPHILS # BLD AUTO: 0.1 K/UL (ref 0–0.2)
BASOPHILS NFR BLD: 1.4 % (ref 0–1.9)
BILIRUB SERPL-MCNC: 0.3 MG/DL (ref 0.1–1)
BUN SERPL-MCNC: 10 MG/DL (ref 6–20)
CALCIUM SERPL-MCNC: 8.2 MG/DL (ref 8.7–10.5)
CHLORIDE SERPL-SCNC: 111 MMOL/L (ref 95–110)
CHOLEST SERPL-MCNC: 241 MG/DL (ref 120–199)
CHOLEST/HDLC SERPL: 4.5 {RATIO} (ref 2–5)
CO2 SERPL-SCNC: 23 MMOL/L (ref 23–29)
CREAT SERPL-MCNC: 0.9 MG/DL (ref 0.5–1.4)
DIFFERENTIAL METHOD: NORMAL
EOSINOPHIL # BLD AUTO: 0.1 K/UL (ref 0–0.5)
EOSINOPHIL NFR BLD: 1.8 % (ref 0–8)
ERYTHROCYTE [DISTWIDTH] IN BLOOD BY AUTOMATED COUNT: 13.6 % (ref 11.5–14.5)
EST. GFR  (NO RACE VARIABLE): >60 ML/MIN/1.73 M^2
GLUCOSE SERPL-MCNC: 97 MG/DL (ref 70–110)
HCT VFR BLD AUTO: 39.8 % (ref 37–48.5)
HDLC SERPL-MCNC: 54 MG/DL (ref 40–75)
HDLC SERPL: 22.4 % (ref 20–50)
HGB BLD-MCNC: 13.1 G/DL (ref 12–16)
IMM GRANULOCYTES # BLD AUTO: 0.03 K/UL (ref 0–0.04)
IMM GRANULOCYTES NFR BLD AUTO: 0.4 % (ref 0–0.5)
LDLC SERPL CALC-MCNC: 170.6 MG/DL (ref 63–159)
LYMPHOCYTES # BLD AUTO: 1.9 K/UL (ref 1–4.8)
LYMPHOCYTES NFR BLD: 27.1 % (ref 18–48)
MCH RBC QN AUTO: 28.1 PG (ref 27–31)
MCHC RBC AUTO-ENTMCNC: 32.9 G/DL (ref 32–36)
MCV RBC AUTO: 85 FL (ref 82–98)
MONOCYTES # BLD AUTO: 0.6 K/UL (ref 0.3–1)
MONOCYTES NFR BLD: 8.4 % (ref 4–15)
NEUTROPHILS # BLD AUTO: 4.3 K/UL (ref 1.8–7.7)
NEUTROPHILS NFR BLD: 60.9 % (ref 38–73)
NONHDLC SERPL-MCNC: 187 MG/DL
NRBC BLD-RTO: 0 /100 WBC
PLATELET # BLD AUTO: 285 K/UL (ref 150–450)
PMV BLD AUTO: 10.6 FL (ref 9.2–12.9)
POTASSIUM SERPL-SCNC: 4.4 MMOL/L (ref 3.5–5.1)
PROT SERPL-MCNC: 6.1 G/DL (ref 6–8.4)
RBC # BLD AUTO: 4.67 M/UL (ref 4–5.4)
SODIUM SERPL-SCNC: 139 MMOL/L (ref 136–145)
TRIGL SERPL-MCNC: 82 MG/DL (ref 30–150)
TSH SERPL DL<=0.005 MIU/L-ACNC: 3.99 UIU/ML (ref 0.4–4)
WBC # BLD AUTO: 7.04 K/UL (ref 3.9–12.7)

## 2023-05-15 PROCEDURE — 80053 COMPREHEN METABOLIC PANEL: CPT | Performed by: FAMILY MEDICINE

## 2023-05-15 PROCEDURE — 36415 COLL VENOUS BLD VENIPUNCTURE: CPT | Mod: PO | Performed by: FAMILY MEDICINE

## 2023-05-15 PROCEDURE — 80061 LIPID PANEL: CPT | Performed by: FAMILY MEDICINE

## 2023-05-15 PROCEDURE — 85025 COMPLETE CBC W/AUTO DIFF WBC: CPT | Performed by: FAMILY MEDICINE

## 2023-05-15 PROCEDURE — 84443 ASSAY THYROID STIM HORMONE: CPT | Performed by: FAMILY MEDICINE

## 2023-05-16 ENCOUNTER — PATIENT MESSAGE (OUTPATIENT)
Dept: FAMILY MEDICINE | Facility: CLINIC | Age: 53
End: 2023-05-16
Payer: COMMERCIAL

## 2023-05-16 ENCOUNTER — LAB VISIT (OUTPATIENT)
Dept: LAB | Facility: HOSPITAL | Age: 53
End: 2023-05-16
Attending: FAMILY MEDICINE
Payer: COMMERCIAL

## 2023-05-16 DIAGNOSIS — Z12.11 COLON CANCER SCREENING: ICD-10-CM

## 2023-05-16 LAB — HEMOCCULT STL QL IA: NEGATIVE

## 2023-05-16 PROCEDURE — 82274 ASSAY TEST FOR BLOOD FECAL: CPT | Performed by: FAMILY MEDICINE

## 2023-06-07 ENCOUNTER — HOSPITAL ENCOUNTER (OUTPATIENT)
Dept: RADIOLOGY | Facility: HOSPITAL | Age: 53
Discharge: HOME | End: 2023-06-07
Attending: FAMILY MEDICINE
Payer: COMMERCIAL

## 2023-06-07 DIAGNOSIS — R92.8 ABNORMAL MAMMOGRAM OF LEFT BREAST: ICD-10-CM

## 2023-06-07 PROCEDURE — 76642 ULTRASOUND BREAST LIMITED: CPT | Mod: 26,LT,, | Performed by: RADIOLOGY

## 2023-06-07 PROCEDURE — 76642 US BREAST LEFT LIMITED: ICD-10-PCS | Mod: 26,LT,, | Performed by: RADIOLOGY

## 2023-06-07 PROCEDURE — 77061 MAMMO DIGITAL DIAGNOSTIC LEFT WITH TOMO: ICD-10-PCS | Mod: 26,LT,, | Performed by: RADIOLOGY

## 2023-06-07 PROCEDURE — 77061 BREAST TOMOSYNTHESIS UNI: CPT | Mod: TC,LT

## 2023-06-07 PROCEDURE — 76642 ULTRASOUND BREAST LIMITED: CPT | Mod: TC,LT

## 2023-06-07 PROCEDURE — 77065 DX MAMMO INCL CAD UNI: CPT | Mod: 26,LT,, | Performed by: RADIOLOGY

## 2023-06-07 PROCEDURE — 77065 MAMMO DIGITAL DIAGNOSTIC LEFT WITH TOMO: ICD-10-PCS | Mod: 26,LT,, | Performed by: RADIOLOGY

## 2023-06-07 PROCEDURE — 77061 BREAST TOMOSYNTHESIS UNI: CPT | Mod: 26,LT,, | Performed by: RADIOLOGY

## 2023-06-28 ENCOUNTER — OFFICE VISIT (OUTPATIENT)
Dept: PULMONOLOGY | Facility: CLINIC | Age: 53
End: 2023-06-28
Payer: COMMERCIAL

## 2023-06-28 VITALS
WEIGHT: 211.19 LBS | OXYGEN SATURATION: 96 % | DIASTOLIC BLOOD PRESSURE: 81 MMHG | SYSTOLIC BLOOD PRESSURE: 128 MMHG | HEIGHT: 67 IN | HEART RATE: 60 BPM | BODY MASS INDEX: 33.15 KG/M2

## 2023-06-28 DIAGNOSIS — G47.33 OSA ON CPAP: ICD-10-CM

## 2023-06-28 DIAGNOSIS — G47.33 OSA (OBSTRUCTIVE SLEEP APNEA): Primary | ICD-10-CM

## 2023-06-28 PROCEDURE — 99214 PR OFFICE/OUTPT VISIT, EST, LEVL IV, 30-39 MIN: ICD-10-PCS | Mod: S$GLB,,, | Performed by: INTERNAL MEDICINE

## 2023-06-28 PROCEDURE — 3074F PR MOST RECENT SYSTOLIC BLOOD PRESSURE < 130 MM HG: ICD-10-PCS | Mod: CPTII,S$GLB,, | Performed by: INTERNAL MEDICINE

## 2023-06-28 PROCEDURE — 3079F DIAST BP 80-89 MM HG: CPT | Mod: CPTII,S$GLB,, | Performed by: INTERNAL MEDICINE

## 2023-06-28 PROCEDURE — 99999 PR PBB SHADOW E&M-EST. PATIENT-LVL III: CPT | Mod: PBBFAC,,, | Performed by: INTERNAL MEDICINE

## 2023-06-28 PROCEDURE — 1159F PR MEDICATION LIST DOCUMENTED IN MEDICAL RECORD: ICD-10-PCS | Mod: CPTII,S$GLB,, | Performed by: INTERNAL MEDICINE

## 2023-06-28 PROCEDURE — 3008F BODY MASS INDEX DOCD: CPT | Mod: CPTII,S$GLB,, | Performed by: INTERNAL MEDICINE

## 2023-06-28 PROCEDURE — 3074F SYST BP LT 130 MM HG: CPT | Mod: CPTII,S$GLB,, | Performed by: INTERNAL MEDICINE

## 2023-06-28 PROCEDURE — 3079F PR MOST RECENT DIASTOLIC BLOOD PRESSURE 80-89 MM HG: ICD-10-PCS | Mod: CPTII,S$GLB,, | Performed by: INTERNAL MEDICINE

## 2023-06-28 PROCEDURE — 99999 PR PBB SHADOW E&M-EST. PATIENT-LVL III: ICD-10-PCS | Mod: PBBFAC,,, | Performed by: INTERNAL MEDICINE

## 2023-06-28 PROCEDURE — 1159F MED LIST DOCD IN RCRD: CPT | Mod: CPTII,S$GLB,, | Performed by: INTERNAL MEDICINE

## 2023-06-28 PROCEDURE — 99214 OFFICE O/P EST MOD 30 MIN: CPT | Mod: S$GLB,,, | Performed by: INTERNAL MEDICINE

## 2023-06-28 PROCEDURE — 3008F PR BODY MASS INDEX (BMI) DOCUMENTED: ICD-10-PCS | Mod: CPTII,S$GLB,, | Performed by: INTERNAL MEDICINE

## 2023-06-28 NOTE — PROGRESS NOTES
Wendy Cody  was seen as a new patient to me for the evaluation of mouna.    CHIEF COMPLAINT:    Chief Complaint   Patient presents with    Apnea       HISTORY OF PRESENT ILLNESS: Wendy Cody is a 52 y.o. female is here for sleep evaluation.   Patient was followed by MANPREET Parra for mouna.  Diagnosed with mouna in 2022 with ahi of 7.  Patient was started on Kimberley apap.  Patient was doing well with apap initially.  Patient contracted respiratory infection 1/2023.  Lots of coughing.  Stopped apap with respiratory infection due to cough.  Cough has improved but still has difficulty tolerating apap.  Mask often comes off in the middle of the night.  No recollection of taking mask off.      STOPBANG during initial visit:  Snore:  yes  Tire:  yes  Observed apnea:  yes  Pressure (HTN):  no  BMI:  Body mass index is 33.08 kg/m².   Age:  52 y.o.  Neck (inch):  15  Gender:  female    [place] Sleepiness Scale score during initial sleep evaluation was 14.    SLEEP ROUTINE:  Activity the hour prior to sleep: watch tv or homework    Bed partner:  alone   Time to bed:  9:30-10 pm   Lights off:  off  Sleep onset latency:  few minutes        Disruptions or awakenings:    0-1 (can have difficulty going back to sleep)    Wakeup time:      5:30 am   Perceived sleep quality:  tire       Daytime naps:      none  Weekend sleep routine:      same  Caffeine use: 3-4 cups coffee in am   exercise habit:   3 days per week with weight, cardio      PAST MEDICAL HISTORY:    Active Ambulatory Problems     Diagnosis Date Noted    Lumbar spondylosis 07/23/2021    DDD (degenerative disc disease), lumbar 07/23/2021    Lumbar radiculopathy 07/23/2021    Sleep-related breathing disorder 12/30/2021    Ruptured varicosity 12/30/2021    MOUNA on CPAP 05/13/2022     Resolved Ambulatory Problems     Diagnosis Date Noted    No Resolved Ambulatory Problems     Past Medical History:   Diagnosis Date    TBI (traumatic brain injury)                 PAST SURGICAL  "HISTORY:    Past Surgical History:   Procedure Laterality Date    BUNIONECTOMY      2015     SECTION      2008, 2017    hand surgery      fracture and ligament and revision    NASAL POLYP EXCISION      TONSILLECTOMY           FAMILY HISTORY:                Family History   Problem Relation Age of Onset    Diabetes type II Mother     Lymphoma Mother     Heart attack Father 38    Hyperlipidemia Father     Ovarian cancer Sister 38    Alzheimer's disease Maternal Grandmother     Alzheimer's disease Paternal Grandmother        SOCIAL HISTORY:          Tobacco:   Social History     Tobacco Use   Smoking Status Never   Smokeless Tobacco Never       alcohol use:    Social History     Substance and Sexual Activity   Alcohol Use Not Currently                 Occupation:  emergency  and full college student    ALLERGIES:    Review of patient's allergies indicates:   Allergen Reactions    Bee pollen Swelling     Bee Sting, not pollen per patient    Poultry Nausea And Vomiting       CURRENT MEDICATIONS:    Current Outpatient Medications   Medication Sig Dispense Refill    simvastatin (ZOCOR) 20 MG tablet Take 1 tablet (20 mg total) by mouth every evening. 90 tablet 3     No current facility-administered medications for this visit.                  REVIEW OF SYSTEMS:     Sleep related symptoms as per HPI.  CONST:Denies weight gain ; lost 20 lbs since   HEENT: Denies sinus congestion  PULM: Denies dyspnea  CARD:  Denies palpitations   GI:  occasional acid reflux  : Denies polyuria  NEURO: intermittent headaches  PSYCH: Denies mood disturbance  HEME: Denies anemia   Otherwise, a balance of systems reviewed is negative.          PHYSICAL EXAM:  Vitals:    23 1342   BP: 128/81   Pulse: 60   SpO2: 96%   Weight: 95.8 kg (211 lb 3.2 oz)   Height: 5' 7" (1.702 m)   PainSc: 0-No pain     Body mass index is 33.08 kg/m².     GENERAL: Normal development, well groomed  HEENT:  Conjunctivae are " non-erythematous; Pupils equal, round, and reactive to light; Nose is symmetrical; Nasal mucosa is pink and moist; Septum is midline; Inferior turbinates are normal; Nasal airflow is mildly congested; Posterior pharynx is pink; Modified Mallampati: 2; Posterior palate is normal; Tonsils +1; Uvula is normal and pink;Tongue is normal; Dentition is fair; No TMJ tenderness; Jaw opening and protrusion without click and without discomfort.  NECK: Supple. Neck circumference is 15 inches. No thyromegaly. No palpable nodes.     SKIN: On face and neck: No abrasions, no rashes, no lesions.  No subcutaneous nodules are palpable.  RESPIRATORY: Chest is clear to auscultation.  Normal chest expansion and non-labored breathing at rest.  CARDIOVASCULAR: Normal S1, S2.  No murmurs, gallops or rubs. No carotid bruits bilaterally.  EXTREMITIES: No edema. No clubbing. No cyanosis. Station normal. Gait normal.        NEURO/PSYCH: Oriented to time, place and person. Normal attention span and concentration. Affect is full. Mood is normal.                                              DATA   Hst 1/13/2022: AHI 7    Lab Results   Component Value Date    TSH 3.989 05/15/2023       ASSESSMENT/PLAN    Problem List Items Addressed This Visit       MOUNA on CPAP    Overview     Hst 1/13/2022: AHI 7  Was doing well with apap until respiratory infection  Currently with nasal pillow.  Mask often fall off in the middle of sleep.  Will switch to ffm for more secure fit.          Other Visit Diagnoses       MOUNA (obstructive sleep apnea)    -  Primary    Relevant Orders    CPAP/BIPAP SUPPLIES                Education: During our discussion today, we talked about the etiology of obstructive sleep apnea as well as the potential ramifications of untreated sleep apnea, which could include daytime sleepiness, hypertension, heart disease and/or stroke.     Precautions: The patient was advised to abstain from driving should they feel sleepy or drowsy.          Patient will No follow-ups on file.        30 minutes of total time spent on the encounter, which includes face to face time and non-face to face time preparing to see the patient (eg, review of tests), Obtaining and/or reviewing separately obtained history, documenting clinical information in the electronic or other health record, independently interpreting results (not separately reported) and communicating results to the patient/family/caregiver, or Care coordination (not separately reported).

## 2023-06-28 NOTE — PATIENT INSTRUCTIONS
1.  [facility]Med Sinus Rinse Regular Kit    Recipe 1/4 teaspoon of salt and 1/4 teaspoon of baking soda in distilled water.  Be sure to tilt head forward as shown in picture.         flonase or nasonex over the counter.  2 sprays per nostril daily. Be sure to tilt head forward when dispensing medication.

## 2024-02-21 ENCOUNTER — PATIENT MESSAGE (OUTPATIENT)
Dept: FAMILY MEDICINE | Facility: CLINIC | Age: 54
End: 2024-02-21
Payer: COMMERCIAL

## 2024-03-01 ENCOUNTER — OFFICE VISIT (OUTPATIENT)
Dept: FAMILY MEDICINE | Facility: CLINIC | Age: 54
End: 2024-03-01
Payer: COMMERCIAL

## 2024-03-01 VITALS
HEIGHT: 67 IN | OXYGEN SATURATION: 96 % | TEMPERATURE: 98 F | SYSTOLIC BLOOD PRESSURE: 120 MMHG | DIASTOLIC BLOOD PRESSURE: 66 MMHG | WEIGHT: 207.69 LBS | BODY MASS INDEX: 32.6 KG/M2 | HEART RATE: 68 BPM

## 2024-03-01 DIAGNOSIS — Z12.39 ENCOUNTER FOR SCREENING FOR MALIGNANT NEOPLASM OF BREAST, UNSPECIFIED SCREENING MODALITY: ICD-10-CM

## 2024-03-01 DIAGNOSIS — Z00.00 ANNUAL PHYSICAL EXAM: ICD-10-CM

## 2024-03-01 DIAGNOSIS — K64.9 HEMORRHOIDS, UNSPECIFIED HEMORRHOID TYPE: Primary | ICD-10-CM

## 2024-03-01 PROCEDURE — 3074F SYST BP LT 130 MM HG: CPT | Mod: CPTII,S$GLB,, | Performed by: FAMILY MEDICINE

## 2024-03-01 PROCEDURE — 99999 PR PBB SHADOW E&M-EST. PATIENT-LVL IV: CPT | Mod: PBBFAC,,, | Performed by: FAMILY MEDICINE

## 2024-03-01 PROCEDURE — 3078F DIAST BP <80 MM HG: CPT | Mod: CPTII,S$GLB,, | Performed by: FAMILY MEDICINE

## 2024-03-01 PROCEDURE — 3008F BODY MASS INDEX DOCD: CPT | Mod: CPTII,S$GLB,, | Performed by: FAMILY MEDICINE

## 2024-03-01 PROCEDURE — 99213 OFFICE O/P EST LOW 20 MIN: CPT | Mod: S$GLB,,, | Performed by: FAMILY MEDICINE

## 2024-03-01 PROCEDURE — 1159F MED LIST DOCD IN RCRD: CPT | Mod: CPTII,S$GLB,, | Performed by: FAMILY MEDICINE

## 2024-03-01 RX ORDER — FLAVORING AGENT
OIL (ML) MISCELLANEOUS
COMMUNITY

## 2024-03-01 NOTE — PROGRESS NOTES
Subjective     Patient ID: Wendy Cody is a 53 y.o. female.    Chief Complaint: Annual Exam    53 year old female presents for concerns about her hemorrhoid. She states when she wipes multiple times it bleeds and it is pulsating. She states she has had this since being in the . She is having regular bowel movements.       She had an ultrasound last year and it recommended compression stockings. She has swelling in her right leg when she works out.       Past Medical History:   Diagnosis Date    TBI (traumatic brain injury)       Past Surgical History:   Procedure Laterality Date    BUNIONECTOMY      2015     SECTION      , 2017    hand surgery      fracture and ligament and revision    NASAL POLYP EXCISION      TONSILLECTOMY       Family History   Problem Relation Age of Onset    Diabetes type II Mother     Lymphoma Mother     Heart attack Father 38    Hyperlipidemia Father     Ovarian cancer Sister 38    Alzheimer's disease Maternal Grandmother     Alzheimer's disease Paternal Grandmother      Social History     Socioeconomic History    Marital status:     Number of children: 2   Occupational History     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   Tobacco Use    Smoking status: Never    Smokeless tobacco: Never   Substance and Sexual Activity    Alcohol use: Not Currently     Social Determinants of Health     Financial Resource Strain: High Risk (3/1/2024)    Overall Financial Resource Strain (CARDIA)     Difficulty of Paying Living Expenses: Hard   Food Insecurity: Food Insecurity Present (3/1/2024)    Hunger Vital Sign     Worried About Running Out of Food in the Last Year: Often true     Ran Out of Food in the Last Year: Often true   Transportation Needs: No Transportation Needs (3/1/2024)    PRAPARE - Transportation     Lack of Transportation (Medical): No     Lack of Transportation (Non-Medical): No   Physical Activity: Sufficiently Active  "(3/1/2024)    Exercise Vital Sign     Days of Exercise per Week: 7 days     Minutes of Exercise per Session: 40 min   Stress: No Stress Concern Present (3/1/2024)    [employer] - Occupational Stress Questionnaire     Feeling of Stress : Only a little   Social Connections: Unknown (3/1/2024)    Social Connection and Isolation Panel [NHANES]     Frequency of Communication with Friends and Family: Once a week     Frequency of Social Gatherings with Friends and Family: Never     Active Member of Clubs or Organizations: Yes     Attends Club or Organization Meetings: 1 to 4 times per year     Marital Status:    Housing Stability: High Risk (3/1/2024)    Housing Stability Vital Sign     Unable to Pay for Housing in the Last Year: Yes     Number of Places Lived in the Last Year: 1     Unstable Housing in the Last Year: No       Review of Systems       Objective   Vitals:    03/01/24 1343   BP: 120/66   Pulse: 68   Temp: 98.2 °F (36.8 °C)   TempSrc: Oral   SpO2: 96%   Weight: 94.2 kg (207 lb 10.8 oz)   Height: 5' 7" (1.702 m)   \  Physical Exam       Assessment and Plan     1. Hemorrhoids, unspecified hemorrhoid type  -     hydrocortisone-pramoxine (PROCTOFOAM-HS) rectal foam; Place 1 applicator rectally 2 (two) times daily.  Dispense: 10 g; Refill: 5    2. Annual physical exam  -     CBC Auto Differential; Future; Expected date: 03/01/2024  -     Comprehensive Metabolic Panel; Future; Expected date: 03/01/2024  -     Lipid Panel; Future; Expected date: 03/01/2024  -     TSH; Future; Expected date: 03/01/2024  -     Hemoglobin A1C; Future; Expected date: 03/01/2024    3. Encounter for screening for malignant neoplasm of breast, unspecified screening modality  -     Mammo Digital Screening Bilat; Future; Expected date: 03/01/2024                 No follow-ups on file.    "

## 2024-10-18 ENCOUNTER — HOSPITAL ENCOUNTER (OUTPATIENT)
Dept: RADIOLOGY | Facility: HOSPITAL | Age: 54
Discharge: HOME | End: 2024-10-18
Attending: FAMILY MEDICINE
Payer: COMMERCIAL

## 2024-10-18 DIAGNOSIS — Z12.39 ENCOUNTER FOR SCREENING FOR MALIGNANT NEOPLASM OF BREAST, UNSPECIFIED SCREENING MODALITY: ICD-10-CM

## 2024-10-18 PROCEDURE — 77063 BREAST TOMOSYNTHESIS BI: CPT | Mod: 26,,, | Performed by: RADIOLOGY

## 2024-10-18 PROCEDURE — 77067 SCR MAMMO BI INCL CAD: CPT | Mod: TC

## 2024-10-18 PROCEDURE — 77067 SCR MAMMO BI INCL CAD: CPT | Mod: 26,,, | Performed by: RADIOLOGY

## 2024-11-08 ENCOUNTER — OFFICE VISIT (OUTPATIENT)
Dept: FAMILY MEDICINE | Facility: CLINIC | Age: 54
End: 2024-11-08
Payer: COMMERCIAL

## 2024-11-08 VITALS
WEIGHT: 215.38 LBS | RESPIRATION RATE: 16 BRPM | TEMPERATURE: 98 F | BODY MASS INDEX: 33.8 KG/M2 | DIASTOLIC BLOOD PRESSURE: 80 MMHG | HEART RATE: 68 BPM | OXYGEN SATURATION: 96 % | HEIGHT: 67 IN | SYSTOLIC BLOOD PRESSURE: 140 MMHG

## 2024-11-08 DIAGNOSIS — Z00.00 ANNUAL PHYSICAL EXAM: Primary | ICD-10-CM

## 2024-11-08 DIAGNOSIS — Z11.3 SCREENING EXAMINATION FOR STD (SEXUALLY TRANSMITTED DISEASE): ICD-10-CM

## 2024-11-08 DIAGNOSIS — Z01.419 WELL WOMAN EXAM: ICD-10-CM

## 2024-11-08 DIAGNOSIS — Z12.11 COLON CANCER SCREENING: ICD-10-CM

## 2024-11-08 PROCEDURE — 99999 PR PBB SHADOW E&M-EST. PATIENT-LVL IV: CPT | Mod: PBBFAC,,, | Performed by: FAMILY MEDICINE

## 2024-11-08 NOTE — PROGRESS NOTES
Subjective     Patient ID: Wendy Cody is a 54 y.o. female.    Chief Complaint: Annual Exam    54-year-old female presents today for an annual exam as well as a well-woman exam.  She has no vaginal discharge or irritation.  Her last Pap smear was .      Past Medical History:   Diagnosis Date    TBI (traumatic brain injury)       Past Surgical History:   Procedure Laterality Date    BUNIONECTOMY      2015     SECTION      , 2017    hand surgery      fracture and ligament and revision    NASAL POLYP EXCISION      TONSILLECTOMY       Family History   Problem Relation Name Age of Onset    Diabetes type II Mother      Lymphoma Mother      Heart attack Father  38    Hyperlipidemia Father      Ovarian cancer Sister  38    Alzheimer's disease Maternal Grandmother      Alzheimer's disease Paternal Grandmother       Social History     Socioeconomic History    Marital status:     Number of children: 2   Occupational History     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   Tobacco Use    Smoking status: Never    Smokeless tobacco: Never   Substance and Sexual Activity    Alcohol use: Not Currently     Social Drivers of Health     Financial Resource Strain: High Risk (3/1/2024)    Overall Financial Resource Strain (CARDIA)     Difficulty of Paying Living Expenses: Hard   Food Insecurity: Food Insecurity Present (3/1/2024)    Hunger Vital Sign     Worried About Running Out of Food in the Last Year: Often true     Ran Out of Food in the Last Year: Often true   Transportation Needs: No Transportation Needs (3/1/2024)    PRAPARE - Transportation     Lack of Transportation (Medical): No     Lack of Transportation (Non-Medical): No   Physical Activity: Sufficiently Active (3/1/2024)    Exercise Vital Sign     Days of Exercise per Week: 7 days     Minutes of Exercise per Session: 40 min   Stress: No Stress Concern Present (3/1/2024)    [facility] of  "Occupational Health - Occupational Stress Questionnaire     Feeling of Stress : Only a little   Housing Stability: High Risk (3/1/2024)    Housing Stability Vital Sign     Unable to Pay for Housing in the Last Year: Yes     Number of Places Lived in the Last Year: 1     Unstable Housing in the Last Year: No   '  Review of Systems   Gastrointestinal:  Negative for abdominal pain, nausea and vomiting.   Genitourinary:  Negative for difficulty urinating, dyspareunia, dysuria, frequency, genital sores, hematuria, pelvic pain, urgency, vaginal discharge and vaginal pain.          Objective   Vitals:    11/08/24 0933 11/08/24 0944   BP: (!) 142/90 (!) 140/80   Pulse: 68    Resp: 16    Temp: 98.4 °F (36.9 °C)    TempSrc: Oral    SpO2: 96%    Weight: 97.7 kg (215 lb 6.2 oz)    Height: 5' 7" (1.702 m)        Physical Exam  Constitutional:       General: She is not in acute distress.     Appearance: She is well-developed. She is not diaphoretic.   HENT:      Head: Normocephalic and atraumatic.   Eyes:      Conjunctiva/sclera: Conjunctivae normal.   Neck:      Thyroid: No thyromegaly.   Cardiovascular:      Rate and Rhythm: Normal rate and regular rhythm.      Heart sounds: Normal heart sounds. No murmur heard.     No friction rub. No gallop.   Pulmonary:      Effort: Pulmonary effort is normal. No respiratory distress.      Breath sounds: Normal breath sounds. No stridor. No wheezing, rhonchi or rales.   Chest:   Breasts:     Breasts are symmetrical.      Right: No inverted nipple, mass, nipple discharge, skin change or tenderness.      Left: No inverted nipple, mass, nipple discharge, skin change or tenderness.   Genitourinary:     Labia:         Right: No rash, tenderness, lesion or injury.         Left: No rash, tenderness, lesion or injury.       Vagina: No signs of injury and foreign body. No vaginal discharge, erythema, tenderness or bleeding.      Cervix: No cervical motion tenderness, discharge or friability.      " Uterus: Not deviated, not enlarged, not fixed, not tender and no uterine prolapse.    Musculoskeletal:      Cervical back: Neck supple.      Right lower leg: No edema.      Left lower leg: No edema.            Assessment and Plan     1. Annual physical exam    2. Well woman exam  -     Liquid-Based Pap Smear, Screening  -     HPV High Risk Genotypes, PCR    3. Colon cancer screening  -     Cologuard Screening (Multitarget Stool DNA); Future; Expected date: 11/08/2024    4. Screening examination for STD (sexually transmitted disease)  -     Vaginosis Screen by DNA Probe; Future; Expected date: 11/08/2024  -     C. trachomatis/N. gonorrhoeae by AMP DNA; Future; Expected date: 11/08/2024                 No follow-ups on file.

## 2024-11-08 NOTE — PROGRESS NOTES
Health Maintenance Due   Topic     HIV Screening  CONSULT WITH PCP    Hemoglobin A1c (Diabetic Prevention Screening)  CONSULT WITH PCP    Shingles Vaccine (1 of 2) CONSULT WITH PCP    Colorectal Cancer Screening  CONSULT WITH PCP    Influenza Vaccine (1) CONSULT WITH PCP    COVID-19 Vaccine (3 - 2024-25 season) Not offered at this facility.

## 2024-11-22 ENCOUNTER — PATIENT MESSAGE (OUTPATIENT)
Dept: FAMILY MEDICINE | Facility: CLINIC | Age: 54
End: 2024-11-22
Payer: COMMERCIAL

## 2024-11-22 RX ORDER — METRONIDAZOLE 500 MG/1
500 TABLET ORAL EVERY 12 HOURS
Qty: 14 TABLET | Refills: 0 | Status: SHIPPED | OUTPATIENT
Start: 2024-11-22 | End: 2024-11-29

## 2024-11-29 ENCOUNTER — LAB VISIT (OUTPATIENT)
Dept: LAB | Facility: HOSPITAL | Age: 54
End: 2024-11-29
Attending: FAMILY MEDICINE
Payer: COMMERCIAL

## 2024-11-29 DIAGNOSIS — Z00.00 ANNUAL PHYSICAL EXAM: ICD-10-CM

## 2024-11-29 LAB
ALBUMIN SERPL BCP-MCNC: 3.6 G/DL (ref 3.5–5.2)
ALP SERPL-CCNC: 58 U/L (ref 40–150)
ALT SERPL W/O P-5'-P-CCNC: 10 U/L (ref 10–44)
ANION GAP SERPL CALC-SCNC: 8 MMOL/L (ref 8–16)
AST SERPL-CCNC: 13 U/L (ref 10–40)
BASOPHILS # BLD AUTO: 0.08 K/UL (ref 0–0.2)
BASOPHILS NFR BLD: 0.9 % (ref 0–1.9)
BILIRUB SERPL-MCNC: 0.5 MG/DL (ref 0.1–1)
BUN SERPL-MCNC: 10 MG/DL (ref 6–20)
CALCIUM SERPL-MCNC: 9 MG/DL (ref 8.7–10.5)
CHLORIDE SERPL-SCNC: 109 MMOL/L (ref 95–110)
CHOLEST SERPL-MCNC: 237 MG/DL (ref 120–199)
CHOLEST/HDLC SERPL: 5.3 {RATIO} (ref 2–5)
CO2 SERPL-SCNC: 22 MMOL/L (ref 23–29)
CREAT SERPL-MCNC: 0.8 MG/DL (ref 0.5–1.4)
DIFFERENTIAL METHOD BLD: NORMAL
EOSINOPHIL # BLD AUTO: 0.1 K/UL (ref 0–0.5)
EOSINOPHIL NFR BLD: 1.6 % (ref 0–8)
ERYTHROCYTE [DISTWIDTH] IN BLOOD BY AUTOMATED COUNT: 12.5 % (ref 11.5–14.5)
EST. GFR  (NO RACE VARIABLE): >60 ML/MIN/1.73 M^2
ESTIMATED AVG GLUCOSE: 100 MG/DL (ref 68–131)
GLUCOSE SERPL-MCNC: 89 MG/DL (ref 70–110)
HBA1C MFR BLD: 5.1 % (ref 4–5.6)
HCT VFR BLD AUTO: 42.3 % (ref 37–48.5)
HDLC SERPL-MCNC: 45 MG/DL (ref 40–75)
HDLC SERPL: 19 % (ref 20–50)
HGB BLD-MCNC: 14.1 G/DL (ref 12–16)
IMM GRANULOCYTES # BLD AUTO: 0.04 K/UL (ref 0–0.04)
IMM GRANULOCYTES NFR BLD AUTO: 0.5 % (ref 0–0.5)
LDLC SERPL CALC-MCNC: 176.4 MG/DL (ref 63–159)
LYMPHOCYTES # BLD AUTO: 2 K/UL (ref 1–4.8)
LYMPHOCYTES NFR BLD: 23.2 % (ref 18–48)
MCH RBC QN AUTO: 28.8 PG (ref 27–31)
MCHC RBC AUTO-ENTMCNC: 33.3 G/DL (ref 32–36)
MCV RBC AUTO: 86 FL (ref 82–98)
MONOCYTES # BLD AUTO: 0.6 K/UL (ref 0.3–1)
MONOCYTES NFR BLD: 6.9 % (ref 4–15)
NEUTROPHILS # BLD AUTO: 5.9 K/UL (ref 1.8–7.7)
NEUTROPHILS NFR BLD: 66.9 % (ref 38–73)
NONHDLC SERPL-MCNC: 192 MG/DL
NRBC BLD-RTO: 0 /100 WBC
PLATELET # BLD AUTO: 262 K/UL (ref 150–450)
PMV BLD AUTO: 10.4 FL (ref 9.2–12.9)
POTASSIUM SERPL-SCNC: 4.4 MMOL/L (ref 3.5–5.1)
PROT SERPL-MCNC: 7 G/DL (ref 6–8.4)
RBC # BLD AUTO: 4.9 M/UL (ref 4–5.4)
SODIUM SERPL-SCNC: 139 MMOL/L (ref 136–145)
TRIGL SERPL-MCNC: 78 MG/DL (ref 30–150)
TSH SERPL DL<=0.005 MIU/L-ACNC: 3.29 UIU/ML (ref 0.4–4)
WBC # BLD AUTO: 8.78 K/UL (ref 3.9–12.7)

## 2024-11-29 PROCEDURE — 36415 COLL VENOUS BLD VENIPUNCTURE: CPT | Mod: PO | Performed by: FAMILY MEDICINE

## 2024-11-29 PROCEDURE — 80053 COMPREHEN METABOLIC PANEL: CPT | Performed by: FAMILY MEDICINE

## 2024-11-29 PROCEDURE — 85025 COMPLETE CBC W/AUTO DIFF WBC: CPT | Performed by: FAMILY MEDICINE

## 2024-11-29 PROCEDURE — 80061 LIPID PANEL: CPT | Performed by: FAMILY MEDICINE

## 2024-11-29 PROCEDURE — 84443 ASSAY THYROID STIM HORMONE: CPT | Performed by: FAMILY MEDICINE

## 2024-11-29 PROCEDURE — 83036 HEMOGLOBIN GLYCOSYLATED A1C: CPT | Performed by: FAMILY MEDICINE

## 2024-12-09 ENCOUNTER — PATIENT MESSAGE (OUTPATIENT)
Dept: FAMILY MEDICINE | Facility: CLINIC | Age: 54
End: 2024-12-09
Payer: COMMERCIAL

## 2025-03-05 ENCOUNTER — PATIENT OUTREACH (OUTPATIENT)
Dept: ADMINISTRATIVE | Facility: HOSPITAL | Age: 55
End: 2025-03-05
Payer: COMMERCIAL

## 2025-03-05 NOTE — PROGRESS NOTES
Population Health Chart Review & Patient Outreach Details      Additional [facility] Notes:    **PLEASE RECHECK BLOOD PRESSURE OR SCHEDULE NURSE VISIT IF NOT IN RANGE DURING VISIT.    HM and immunization's reviewed and updated.  Place in visit note to advise.            Updates Requested / Reviewed:      Updated Care Coordination Note, Care Everywhere, and Care Team Updated         Health Maintenance Topics Overdue:      VBHM Score: 0     Patient is not due for any topics at this time.                       Health Maintenance Topic(s) Outreach Outcomes & Actions Taken:    Blood Pressure - Outreach Outcomes & Actions Taken  : Place in visit note to advise.

## 2025-03-06 ENCOUNTER — HOSPITAL ENCOUNTER (OUTPATIENT)
Dept: RADIOLOGY | Facility: HOSPITAL | Age: 55
Discharge: HOME | End: 2025-03-06
Attending: FAMILY MEDICINE
Payer: COMMERCIAL

## 2025-03-06 ENCOUNTER — OFFICE VISIT (OUTPATIENT)
Dept: FAMILY MEDICINE | Facility: CLINIC | Age: 55
End: 2025-03-06
Payer: COMMERCIAL

## 2025-03-06 ENCOUNTER — PATIENT MESSAGE (OUTPATIENT)
Dept: FAMILY MEDICINE | Facility: CLINIC | Age: 55
End: 2025-03-06

## 2025-03-06 VITALS
TEMPERATURE: 99 F | DIASTOLIC BLOOD PRESSURE: 84 MMHG | HEART RATE: 81 BPM | WEIGHT: 211.63 LBS | OXYGEN SATURATION: 96 % | BODY MASS INDEX: 33.21 KG/M2 | HEIGHT: 67 IN | SYSTOLIC BLOOD PRESSURE: 120 MMHG

## 2025-03-06 DIAGNOSIS — G89.29 CHRONIC MIDLINE LOW BACK PAIN WITHOUT SCIATICA: ICD-10-CM

## 2025-03-06 DIAGNOSIS — M54.50 CHRONIC MIDLINE LOW BACK PAIN WITHOUT SCIATICA: ICD-10-CM

## 2025-03-06 DIAGNOSIS — M25.561 CHRONIC PAIN OF RIGHT KNEE: Primary | ICD-10-CM

## 2025-03-06 DIAGNOSIS — G89.29 CHRONIC PAIN OF RIGHT KNEE: ICD-10-CM

## 2025-03-06 DIAGNOSIS — G89.29 CHRONIC PAIN OF RIGHT KNEE: Primary | ICD-10-CM

## 2025-03-06 DIAGNOSIS — M25.561 CHRONIC PAIN OF RIGHT KNEE: ICD-10-CM

## 2025-03-06 PROCEDURE — 73560 X-RAY EXAM OF KNEE 1 OR 2: CPT | Mod: 26,RT,, | Performed by: RADIOLOGY

## 2025-03-06 PROCEDURE — 72100 X-RAY EXAM L-S SPINE 2/3 VWS: CPT | Mod: TC,FY

## 2025-03-06 PROCEDURE — 72100 X-RAY EXAM L-S SPINE 2/3 VWS: CPT | Mod: 26,,, | Performed by: RADIOLOGY

## 2025-03-06 PROCEDURE — 1159F MED LIST DOCD IN RCRD: CPT | Mod: CPTII,S$GLB,, | Performed by: FAMILY MEDICINE

## 2025-03-06 PROCEDURE — 99999 PR PBB SHADOW E&M-EST. PATIENT-LVL IV: CPT | Mod: PBBFAC,,, | Performed by: FAMILY MEDICINE

## 2025-03-06 PROCEDURE — 99214 OFFICE O/P EST MOD 30 MIN: CPT | Mod: S$GLB,,, | Performed by: FAMILY MEDICINE

## 2025-03-06 PROCEDURE — 73560 X-RAY EXAM OF KNEE 1 OR 2: CPT | Mod: TC,FY,RT

## 2025-03-06 PROCEDURE — 3074F SYST BP LT 130 MM HG: CPT | Mod: CPTII,S$GLB,, | Performed by: FAMILY MEDICINE

## 2025-03-06 PROCEDURE — 3079F DIAST BP 80-89 MM HG: CPT | Mod: CPTII,S$GLB,, | Performed by: FAMILY MEDICINE

## 2025-03-06 PROCEDURE — 3008F BODY MASS INDEX DOCD: CPT | Mod: CPTII,S$GLB,, | Performed by: FAMILY MEDICINE

## 2025-03-06 RX ORDER — METHOCARBAMOL 500 MG/1
500 TABLET, FILM COATED ORAL NIGHTLY PRN
Qty: 30 TABLET | Refills: 0 | Status: SHIPPED | OUTPATIENT
Start: 2025-03-06 | End: 2025-03-16

## 2025-03-06 NOTE — PROGRESS NOTES
Subjective     Patient ID: Wendy Cody is a 54 y.o. female.    Chief Complaint: Knee Pain (Sharp pain off and on) and Tailbone Pain (Tightness   )    Review of Systems       Objective     Physical Exam       Assessment and Plan     {There are no diagnoses linked to this encounter. (Refresh or delete this SmartLink)}    ***         No follow-ups on file.

## 2025-03-06 NOTE — PROGRESS NOTES
"Subjective     Patient ID: Wendy Cody is a 54 y.o. female.    Chief Complaint: Knee Pain (Sharp pain off and on) and Tailbone Pain (Tightness   )    54 year-old female presents with right knee pain that is chronic. She notices a sharp pain alsong the medial aspect of her knee when she lands the wrong way. She admits to clicking in her knee and it gave out for the first time this morning. She states it hurts when she starts running, but is able to continue running for 2-3 miles. She has had knee issues for a long time and has had fresh cell treatment to restore the cartilage.She has not taken anything for this.     She also has lower back pain that is chronic. She states it feels like a belt is on her back and the rest of her back has to move around it. She has done yoga, which has improved some fo her pain, but exacerbated others. She has leg numbness, but it is intermittent.  She has not taken anything for this.  She uses essential oils for this. , but it is feels like icy hot.  She also has a massager for this as well.       Review of Systems       Objective   Vitals:    03/06/25 0826   BP: 120/84   Pulse: 81   Temp: 98.8 °F (37.1 °C)   TempSrc: Oral   SpO2: 96%   Weight: 96 kg (211 lb 10.3 oz)   Height: 5' 7" (1.702 m)       Physical Exam  Constitutional:       Appearance: Normal appearance. She is not ill-appearing, toxic-appearing or diaphoretic.   HENT:      Head: Normocephalic and atraumatic.   Musculoskeletal:      Lumbar back: Spasms present. No lacerations, tenderness or bony tenderness. Normal range of motion. Negative right straight leg raise test and negative left straight leg raise test. No scoliosis.      Right knee: No bony tenderness. Normal range of motion. Tenderness present over the medial joint line. No lateral joint line, MCL, LCL, ACL, PCL or patellar tendon tenderness.   Neurological:      Mental Status: She is alert.            Assessment and Plan     1. Chronic pain of right knee  -    "  X-Ray Knee 1 or 2 View Right; Future; Expected date: 03/06/2025    2. Chronic midline low back pain without sciatica  -     X-Ray Lumbar Spine AP And Lateral; Future; Expected date: 03/06/2025  -     methocarbamoL (ROBAXIN) 500 MG Tab; Take 1 tablet (500 mg total) by mouth nightly as needed.  Dispense: 30 tablet; Refill: 0                 No follow-ups on file.

## 2025-03-06 NOTE — PROGRESS NOTES
"Subjective     Patient ID: Wendy Cody is a 54 y.o. female.    Chief Complaint: Knee Pain (Sharp pain off and on) and Tailbone Pain (Tightness   )    HPI    History of Present Illness               Review of Systems         Objective     Vitals:    03/06/25 0826   BP: 120/84   Pulse: 81   Temp: 98.8 °F (37.1 °C)   TempSrc: Oral   SpO2: 96%   Weight: 96 kg (211 lb 10.3 oz)   Height: 5' 7" (1.702 m)        Physical Exam  Physical Exam                Assessment and Plan     {There are no diagnoses linked to this encounter. (Refresh or delete this SmartLink)}  There are no diagnoses linked to this encounter.    Assessment & Plan                      No follow-ups on file.        This note was generated with the assistance of ambient listening technology. Verbal consent was obtained by the patient and accompanying visitor(s) for the recording of patient appointment to facilitate this note. I attest to having reviewed and edited the generated note for accuracy, though some syntax or spelling errors may persist. Please contact the author of this note for any clarification.    "

## 2025-04-16 ENCOUNTER — CLINICAL SUPPORT (OUTPATIENT)
Dept: REHABILITATION | Facility: HOSPITAL | Age: 55
End: 2025-04-16
Attending: FAMILY MEDICINE
Payer: COMMERCIAL

## 2025-04-16 DIAGNOSIS — R53.1 WEAKNESS: Primary | ICD-10-CM

## 2025-04-16 DIAGNOSIS — G89.29 CHRONIC PAIN OF RIGHT KNEE: ICD-10-CM

## 2025-04-16 DIAGNOSIS — G89.29 CHRONIC MIDLINE LOW BACK PAIN WITHOUT SCIATICA: ICD-10-CM

## 2025-04-16 DIAGNOSIS — M25.561 CHRONIC PAIN OF RIGHT KNEE: ICD-10-CM

## 2025-04-16 DIAGNOSIS — M54.50 CHRONIC MIDLINE LOW BACK PAIN WITHOUT SCIATICA: ICD-10-CM

## 2025-04-16 PROCEDURE — 97110 THERAPEUTIC EXERCISES: CPT | Mod: PN

## 2025-04-16 PROCEDURE — 97161 PT EVAL LOW COMPLEX 20 MIN: CPT | Mod: PN

## 2025-05-02 ENCOUNTER — PATIENT MESSAGE (OUTPATIENT)
Dept: FAMILY MEDICINE | Facility: CLINIC | Age: 55
End: 2025-05-02
Payer: COMMERCIAL

## 2025-05-02 DIAGNOSIS — G89.29 CHRONIC PAIN OF RIGHT KNEE: Primary | ICD-10-CM

## 2025-05-02 DIAGNOSIS — M25.561 CHRONIC PAIN OF RIGHT KNEE: Primary | ICD-10-CM

## 2025-05-09 ENCOUNTER — OFFICE VISIT (OUTPATIENT)
Dept: ORTHOPEDICS | Facility: CLINIC | Age: 55
End: 2025-05-09
Payer: COMMERCIAL

## 2025-05-09 VITALS — HEIGHT: 67 IN | WEIGHT: 211.63 LBS | BODY MASS INDEX: 33.21 KG/M2

## 2025-05-09 DIAGNOSIS — G89.29 CHRONIC PAIN OF RIGHT KNEE: ICD-10-CM

## 2025-05-09 DIAGNOSIS — M25.561 CHRONIC PAIN OF RIGHT KNEE: ICD-10-CM

## 2025-05-09 DIAGNOSIS — M23.91 INTERNAL DERANGEMENT OF RIGHT KNEE: Primary | ICD-10-CM

## 2025-05-09 PROCEDURE — 99999 PR PBB SHADOW E&M-EST. PATIENT-LVL III: CPT | Mod: PBBFAC,,, | Performed by: ORTHOPAEDIC SURGERY

## 2025-05-09 NOTE — PROGRESS NOTES
NEW PATIENT ORTHOPAEDIC: Knee    PRIMARY CARE PHYSICIAN: Joana Goldberg MD   REFERRING PROVIDER: Joana Goldberg MD  [id number] [facility]  [place] [id number]     ASSESSMENT & PLAN:    Impression:  Right Knee Internal Derangement  Right Knee Pes Bursitis     Follow Up Plan: After MRI     Non operative care:    Wendy Cody has physical exam evidence of above and wishes to pursue an non-operative care. I am recommending the following: MRI    Patient comes in with a greater than six-month history of atraumatic onset right knee pain.  This pain was something that she could initially tolerate and live with in his localized to the medial aspect of her knee.  She associates this with exercise.  Over the past few months her pain has been persistent but her symptoms have changed.  She is now reporting more instability of her knee.  She does report intermittent mechanical symptoms in her knee which are not always particularly painful.  This instability is worse when going down stairs.  The mechanical symptoms are worse when squatting.  Clinically she has some mild medial joint line pain but more significant pain localized over her pes bursa.  That would be consistent with a pes bursitis.  However I can not blame instability on her bursitis.  Sits a little bit cloudy of a pictures I think she may have some degenerative meniscus based pathology that is attributing to her instability.  But she is already in physical therapy for both her back and her knee and not providing her relief with regard to this instability.  We discussed treatment options including MRI, injection, oral medications.  She is not really interested in a quick fix to this and wants a definitive diagnosis regarding this instability complaint.  As a result I think MRIs best next step to ensure formal diagnosis and help guide treatment options including pes bursa injections, intra-articular injections, need for arthroscopy.    The patient has  been ordered:  MRI (Criteria for MRI - xray is equivocal)    CONSULTS:   None    ACTIVE PROBLEM LIST  Problem List[1]        SUBJECTIVE    CHIEF COMPLAINT: Knee Pain    HPI:   Wendy Cody is a 54 y.o. female here for evaluation and management of right knee pain. There is not a specific incident that brought about this pain. she has had progressive problems with the knee(s) starting 6 months ago but is now progressing to interfere with activities which include: walking, participating in family activities, enjoying hobbies, and exercise and stairs    Currently the pain in the joint is rated at mild with activity. The pain is intermittent and is located in the knee, located medially. The pain is described as aching, sharp, and throbbing. Relieving factors include rest, over the counter medication , and repositioning.     There is associated Clicking and Popping with instability     Wendy Cody has no additional complaints.     PROGRESSIVE SYMPTOMS:  Pain worsened by weight bearing  Pain effecting living situation  Pain limiting ability to stay fit and healthy    FUNCTIONAL STATUS:   Participate in recreational activities     PREVIOUS TREATMENTS:  Medical: Tylenol  Physical Therapy: Activities Modified , Formal Physical Therapy for 6 weeks, and Home Exercise Program  Previous Orthopaedic Surgery: None    REVIEW OF SYSTEMS:  PAIN ASSESSMENT:  See HPI.  MUSCULOSKELETAL: See HPI.  OTHER 10 point review of systems is negative except as stated in HPI above    PAST MEDICAL HISTORY   has a past medical history of TBI (traumatic brain injury).     PAST SURGICAL HISTORY   has a past surgical history that includes  section; Bunionectomy; hand surgery; Tonsillectomy; Nasal polyp excision; Fracture surgery (); and Adenoidectomy ().     FAMILY HISTORY  family history includes Alzheimer's disease in her maternal grandmother and paternal grandmother; Arthritis in her maternal grandmother and paternal  "grandmother; Cancer in her mother and sister; Depression in her sister and sister; Diabetes in her mother; Diabetes type II in her mother; Heart attack (age of onset: 38) in her father; Heart disease in her father; Hyperlipidemia in her father; Lymphoma in her mother; Ovarian cancer (age of onset: 38) in her sister.     SOCIAL HISTORY   reports that she has never smoked. She has never used smokeless tobacco. She reports that she does not currently use alcohol.     ALLERGIES   Review of patient's allergies indicates:   Allergen Reactions    Bee pollen Swelling     Bee Sting, not pollen per patient    Chicken derived Nausea And Vomiting    Poultry Nausea And Vomiting    Turkey Nausea And Vomiting    Venom-honey bee Other (See Comments)        MEDICATIONS  Medications Ordered Prior to Encounter[2]       PHYSICAL EXAM   height is 5' 7" (1.702 m) and weight is 96 kg (211 lb 10.3 oz).   Body mass index is 33.15 kg/m².      All other systems deferred.  GENERAL:  No acute distress  HABITUS: Obese  GAIT: Non-antalgic  SKIN: Normal  and No erythema, warmth, fluctuance     KNEE EXAM:    right:   Effusion: Minimal joint effusion  TTP: No over Medial/Lateral joint line, MCL, LCL, IT band.  There is tenderness over pes bursa  No crepitus with passive knee ROM  Passive ROM: Extension 0, Flexion 130  No pain with manipulation of patella  Stable to varus/valgus stress. No increased laxity to anterior/posterior drawer testing  negative Tj's test  No pain with IR/ER rotation of the hip  5/5 strength in knee flexion and extension, ankle plantarflexion and dorsiflexion  Neurovascular Status: Sensation intact to light touch in Sural, Saphenous, SPN, DPN, Tibial nerve distribution  2+ pulse DP/PT, normal capillary refill, foot has normal warmth    DATA:  Diagnostic tests reviewed for today's visit:     2v of the knee reveal appears normal for age. There is good alignment with no evidence of fracture, bony abnormalities or signficant " degenerative changes.       [1]   Patient Active Problem List  Diagnosis    Lumbar spondylosis    DDD (degenerative disc disease), lumbar    Lumbar radiculopathy    Sleep-related breathing disorder    Ruptured varicosity    MOUNA on CPAP   [2]   Current Outpatient Medications on File Prior to Visit   Medication Sig Dispense Refill    flavoring agent, bulk, (GRAPEFRUIT) Oil by Misc.(Non-Drug; Combo Route) route.      hydrocortisone-pramoxine (PROCTOFOAM-HS) rectal foam Place 1 applicator rectally 2 (two) times daily. 10 g 5    THYME OIL MISC by Misc.(Non-Drug; Combo Route) route.      UNABLE TO FIND once daily. medication name: femigen      UNABLE TO FIND 2 (two) times a day. medication name: cortistop      UNABLE TO FIND medication name: lemongrass oil       No current facility-administered medications on file prior to visit.

## 2025-05-11 PROBLEM — R53.1 WEAKNESS: Status: ACTIVE | Noted: 2025-05-11

## 2025-05-12 NOTE — PROGRESS NOTES
Outpatient Rehab    Physical Therapy Evaluation    Patient Name: Wendy Cody  MRN: [id number]  YOB: 1970  Encounter Date: 4/16/2025    Therapy Diagnosis:   Encounter Diagnoses   Name Primary?    Chronic pain of right knee     Chronic midline low back pain without sciatica     Weakness Yes     Physician: Joana Goldberg MD    Physician Orders: Eval and Treat  Medical Diagnosis: Chronic pain of right knee  Chronic midline low back pain without sciatica    Visit # / Visits Authorized:  1 / 1  Insurance Authorization Period: 3/6/2025 to 3/6/2026  Date of Evaluation: 4/16/2025  Plan of Care Certification: 4/16/2025 to 10/30/25     Time In: 1505   Time Out: 1550  Total Time (in minutes): 45   Total Billable Time (in minutes):  45 minutes     Intake Outcome Measure for FOTO Survey    Therapist reviewed FOTO scores for Wendy Cody on 4/16/2025.   FOTO report - see Media section or FOTO account episode details.     Intake Score: 76.3%    Precautions:       Subjective   History of Present Illness  Wendy is a 54 y.o. female who reports to physical therapy with a chief concern of low back pain and R knee pain.     The patient reports a medical diagnosis of M25.561,G89.29 (ICD-10-CM) - Chronic pain of right knee  M54.50,G89.29 (ICD-10-CM) - Chronic midline low back pain without sciatica.    Diagnostic tests related to this condition: X-ray.   X-Ray Details: R knee: FINDINGS:  Osseous mineralization is preserved.  No acute displaced fractures.  No suspicious lytic or blastic lesions.  No periosteal reaction.  Mild medial tibiofemoral osteoarthritis.  No subluxation or dislocation.  No joint effusion.  Visualized soft tissues appear within normal limits.                       Lumbar spine: FINDINGS:  Minimal lumbar dextroscoliosis.  No spondylolisthesis.  No spondylolysis.  Vertebral body heights are well maintained.  Osseous mineralization is preserved.  No suspicious lytic or blastic lesions.  Mild  degenerative disc space narrowing from L2-L3 through L5-S1.  Facet joints are well aligned.  SI joints are symmetric.  Sacrum is unremarkable.  Visualized soft tissues appear within normal limits.    History of Present Condition/Illness: Patient reports that she has always been active, has done many running races, and has been serving in the  for many years. She states that she is in pretty good shape but sometimes experiences lower back and R knee pain. She notes the pain occasionally occurs when lifting weights, walking, or during fitness tests when she has to hold a plank for several minutes. She states that she remains fairly active in moderation and now walks instead of running to avoid aggravating her pain. However, she would like to learn ways to manage her pain in addition to what she is already doing, such as frequent walks and weight lifting at the gym. She reports no pain at the moment in both the knee and the back. She states that she mostly notices the knee pain when walking but it is tolerable.     Pain     Patient reports a current pain level of 0/10.               Review of Systems  Patient reports: Osteoarthritis  Patient denies: Bladder Incontinence, Bowel Incontinence, Chest Pain, Dizziness, Fainting, Fever, Headache, Lower Extremity Neurological Deficits, Motion Sickness, Nausea, Night Pain, Saddle Numbness, Shortness of Breath, and Sleep Disturbance        Treatment History  Treatments  Previously Received Treatments: No  Currently Receiving Treatments: No    Living Arrangements  Living Situation  Living Arrangements: Family members  Support Systems: Family members        Employment  Patient does not report that: Does the patient's condition impact their ability to work?  Employment Status: Employed full-time          Past Medical History/Physical Systems Review:   Wendy Cody  has a past medical history of TBI (traumatic brain injury).    Wendy Cody  has a past surgical history  that includes  section; Bunionectomy; hand surgery; Tonsillectomy; Nasal polyp excision; Fracture surgery (); and Adenoidectomy ().    Wendy has a current medication list which includes the following prescription(s): grapefruit, hydrocortisone-pramoxine, thyme oil, UNABLE TO FIND, UNABLE TO FIND, and UNABLE TO FIND.    Review of patient's allergies indicates:   Allergen Reactions    Bee pollen Swelling     Bee Sting, not pollen per patient    Chicken derived Nausea And Vomiting    Poultry Nausea And Vomiting    Turkey Nausea And Vomiting    Venom-honey bee Other (See Comments)        Objective   Posture    Increased lumbar lordosis is observed.                 Spinal Mobility  Hypomobile: Thoracic           Lumbar Range of Motion   Active (deg) Passive (deg) Pain   Flexion  (no limitation and no pain)       Extension  (no limitation and no pain)       Right Lateral Flexion  (no limitation and no pain)       Right Rotation  (no limitation and no pain)       Left Lateral Flexion  (no limitation and no pain)       Left Rotation  (no limitation and no pain)                Hip Range of Motion    All WFL and no pain. However, noted decreased bilateral hip flexors flexibility and hip mobility.              Hip Strength - Planes of Motion   Right Strength Right Pain Left Strength Left  Pain   Flexion (L2) 4-   4-     Extension 3+   3+     ABduction 3+   3+     ADduction 4+   4+     Internal Rotation 4+   4+     External Rotation 4+   4+             Lumbar/Pelvic Girdle Special Tests  Lumbar Tests - Repeated  Negative: Flexion and Extension       Lumbar Tests - SLR and Tension  Negative: Right Passive Straight Leg Raise, Left Passive Straight Leg Raise, Right Crossed Straight Leg Raise, and Left Crossed Straight Leg Raise  Right Rick's Plank Endurance (sec): 35  Left Rick's Plank Endurance (sec): 24    Other Lumbar Tests  Negative: Right Quadrant and Left Quadrant              Hip Special Tests  Eliel  Test  Negative: Right and Left     90/90 Hamstring Test  Negative: Right and Left          Knee Special Tests  Knee Ligament Tests  Negative: Right Anterior Drawer, Left Anterior Drawer, Right Posterior Drawer, and Left Posterior Drawer  Negative: Right Valgus Stress at 0 Degrees, Left Valgus Stress at 0 Degrees, Right Varus Stress at 0 Degrees, Left Varus Stress at 0 Degrees, Right Valgus Stress at 30 Degrees, Left Valgus Stress at 30 Degrees, Left Varus Stress at 30 Degrees, and Right Varus Stress at 30 Degrees       Knee Meniscal Tests  Negative: Right Apley's Compression, Left Apley's Compression, Right Thessaly at 5 Degrees, Left Thessaly at 5 Degrees, Right Thessaly at 20 Degrees, and Left Thessaly at 20 Degrees                     Fall Risk  Functional mobility test results suggest the patient is not: At Risk for Falls  Sit to Stand Testing      The patient completed 19 repetitions of a sit to stand transfer in 30 seconds. without UE support              Treatment:  Therapeutic Exercise  TE 1: Supine dead bugs with legs and arm extension  TE 2: Side plank /c clamshells RTB  TE 3: Thoracic extension on foam roll  TE 4: Seated thoracic self mobilization  TE 5: Standing marching /c RTB      Time Entry(in minutes):  PT Evaluation (Low) Time Entry: 30  Therapeutic Exercise Time Entry: 15    Assessment & Plan   Assessment  Wendy presents with a condition of Low complexity.   Will Comorbidities Impact Care: No       Functional Limitations: Painful locomotion/ambulation, Squatting, Activity tolerance  Impairments: Lack of appropriate home exercise program, Activity intolerance, Impaired physical strength  Personal Factors Affecting Prognosis: Pain    Prognosis: Good  Assessment Details: Wendy referred to outpatient Physical Therapy with a medical diagnosis of M25.561,G89.29 (ICD-10-CM) - Chronic pain of right knee M54.50,G89.29 (ICD-10-CM) - Chronic midline low back pain without sciatica. Upon evaluation, patient  presents with decreased muscle strength, flexibility, and joint mobility. Patient also presents with stiffness, as well as impaired posture. Patient does not have low back pain or knee pain at initial evaluation. But she does demonstrates decreased core and bilateral LE strength. PT will focus on addressing the patient's impairments to enhance function and assist in returning the patient to [facility].     Plan  From a physical therapy perspective, the patient would benefit from: Skilled Rehab Services    Planned therapy interventions include: Therapeutic exercise, Therapeutic activities, Neuromuscular re-education, and Manual therapy.    Planned modalities to include: Biofeedback, Cryotherapy (cold pack), Electrical stimulation - passive/unattended, and Thermotherapy (hot pack).        Visit Frequency: 1 times Per Week for 12 Weeks.       This plan was discussed with Patient.   Discussion participants: Agreed Upon Plan of Care             Patient's spiritual, cultural, and educational needs considered and patient agreeable to plan of care and goals.     Education  Education was done with Patient. The patient's learning style includes Demonstration and Listening. The patient Demonstrates understanding and Verbalizes understanding.         Patient education on nature of current condition and PHYSICAL THERAPY POC. Written HOME EXERCISE PROGRAM provided, reviewed, patient demo understanding.       Goals:   Active       Functional outcome       Patient will show a significant change in FOTO patient-reported outcome tool to demonstrate subjective improvement       Start:  05/11/25    Expected End:  10/30/25            Patient will demonstrate independence in home program for support of progression       Start:  05/11/25    Expected End:  10/30/25               Pain       Patient will report pain of 0/10 demonstrating a reduction of overall pain       Start:  05/11/25    Expected End:  10/30/25               Strength        Patient will demonstrate abdominal strength by her ability to hold a forearm plank for 1 minute and bilateral side plank for 45 seconds or greater with no c/o pain.       Start:  05/11/25    Expected End:  10/30/25            Patient will achieve bilateral hip flexion strength of 5/5       Start:  05/11/25    Expected End:  10/30/25            Patient will achieve bilateral hip extension strength of 5/5       Start:  05/11/25    Expected End:  10/30/25            Patient will achieve bilateral hip abduction strength of 4+/5       Start:  05/11/25    Expected End:  10/30/25                Judah Mendiola PT, DPT

## 2025-05-14 ENCOUNTER — CLINICAL SUPPORT (OUTPATIENT)
Dept: REHABILITATION | Facility: HOSPITAL | Age: 55
End: 2025-05-14
Payer: COMMERCIAL

## 2025-05-14 DIAGNOSIS — R53.1 WEAKNESS: Primary | ICD-10-CM

## 2025-05-14 PROCEDURE — 97112 NEUROMUSCULAR REEDUCATION: CPT | Mod: PN

## 2025-05-15 NOTE — PROGRESS NOTES
Outpatient Rehab    Physical Therapy Visit    Patient Name: Wendy Cody  MRN: [id number]  YOB: 1970  Encounter Date: 5/14/2025    Therapy Diagnosis:   Encounter Diagnosis   Name Primary?    Weakness Yes     Physician: Joana Goldberg MD    Physician Orders: Eval and Treat  Medical Diagnosis: Chronic pain of right knee  Chronic midline low back pain without sciatica    Visit # / Visits Authorized:  1 / 10  Insurance Authorization Period: 4/16/2025 to 12/31/2025  Date of Evaluation: 4/16/2025  Plan of Care Certification: 5/11/2025 to 10/30/2025      PT/PTA:     Number of PTA visits since last PT visit:   Time In: 1602   Time Out: 1700  Total Time (in minutes): 58   Total Billable Time (in minutes):  58 minutes     FOTO:  Intake Score:  %  Survey Score 2:  %  Survey Score 3:  %    Precautions:       Subjective   Patient reports that her R knee pain has gotten worse since initial eval. She states that before, she would feel the pain after doing a lot of activities, but now she feels it with walking, every step. She also feels the pain when pivoting. She states that her back has been feeling better since initial evaluation as she has been doing her prescribed HEP. She states that she saw an orthopedic surgeon for her knee and will be getting an MRI done this upcoming Sunday. She states that her back pain is 0/10 and knee pain is 3/10 currently. She states that the doctor thinks her knee pain might be due to pes anserine bursitis..  Pain reported as 3/10.      Objective       Knee Range of Motion    All knee ROM are WFL. However, slight pain at end range R knee flexion.            Treatment:  Balance/Neuromuscular Re-Education  NMR 1: SLR /c ER 2# 3x10  NMR 2: Supine hamstring curl on swiss ball 2x10  NMR 3: RDLs with barbell, 25 lbs ea, 3x10  NMR 4: Wall sits 4-5x35 sec  NMR 5: Longsitting hamstring stretch 3x30 sec  NMR 6: Standing quad stretch 3-4x30 sec    Time Entry(in minutes):  Neuromuscular  Re-Education Time Entry: 53    Assessment & Plan   Assessment: Patient presents to the clinic with mild symptom irritability pre-session, reporting 3/10 R knee pain and pointpoints to the pes anerine area as her source of pain. Performed special tests of the knee to figure out patient's source of pain. Patient was negative for all R knee ligaments and miniscal special tests. She only reported pain with palpation of the pes anserine area likely indicating a pes anserine bursitis. Patient demonstrates decreased quadriceps and hamstring flexibility. Focused was placed on improving these two impairments. Patient tolerated today's treatment without increase in pain. Patient demonstrated improvements in pain within session. Will progress patient as tolerated.  Evaluation/Treatment Tolerance: Patient tolerated treatment well    Patient will continue to benefit from skilled outpatient physical therapy to address the deficits listed in the problem list box on initial evaluation, provide pt/family education and to maximize pt's level of independence in the home and community environment.     Patient's spiritual, cultural, and educational needs considered and patient agreeable to plan of care and goals.           Plan: Improve R knee pain, and lumbar stability and lumbopelvic control.    Goals:   Active       Functional outcome       Patient will show a significant change in FOTO patient-reported outcome tool to demonstrate subjective improvement       Start:  05/11/25    Expected End:  10/30/25            Patient will demonstrate independence in home program for support of progression       Start:  05/11/25    Expected End:  10/30/25               Pain       Patient will report pain of 0/10 demonstrating a reduction of overall pain       Start:  05/11/25    Expected End:  10/30/25               Strength       Patient will demonstrate abdominal strength by her ability to hold a forearm plank for 1 minute and bilateral side  plank for 45 seconds or greater with no c/o pain.       Start:  05/11/25    Expected End:  10/30/25            Patient will achieve bilateral hip flexion strength of 5/5       Start:  05/11/25    Expected End:  10/30/25            Patient will achieve bilateral hip extension strength of 5/5       Start:  05/11/25    Expected End:  10/30/25            Patient will achieve bilateral hip abduction strength of 4+/5       Start:  05/11/25    Expected End:  10/30/25                Judah Mendiola PT, DPT

## 2025-05-18 ENCOUNTER — HOSPITAL ENCOUNTER (OUTPATIENT)
Dept: RADIOLOGY | Facility: HOSPITAL | Age: 55
Discharge: HOME | End: 2025-05-18
Attending: ORTHOPAEDIC SURGERY
Payer: COMMERCIAL

## 2025-05-18 DIAGNOSIS — M23.91 INTERNAL DERANGEMENT OF RIGHT KNEE: ICD-10-CM

## 2025-05-18 PROCEDURE — 73721 MRI JNT OF LWR EXTRE W/O DYE: CPT | Mod: TC,RT

## 2025-05-18 PROCEDURE — 73721 MRI JNT OF LWR EXTRE W/O DYE: CPT | Mod: 26,RT,, | Performed by: RADIOLOGY

## 2025-05-20 ENCOUNTER — OFFICE VISIT (OUTPATIENT)
Dept: ORTHOPEDICS | Facility: CLINIC | Age: 55
End: 2025-05-20
Payer: COMMERCIAL

## 2025-05-20 VITALS — BODY MASS INDEX: 33.21 KG/M2 | HEIGHT: 67 IN | WEIGHT: 211.63 LBS

## 2025-05-20 DIAGNOSIS — M23.91 INTERNAL DERANGEMENT OF RIGHT KNEE: Primary | ICD-10-CM

## 2025-05-20 PROCEDURE — 99213 OFFICE O/P EST LOW 20 MIN: CPT | Mod: S$GLB,,, | Performed by: ORTHOPAEDIC SURGERY

## 2025-05-20 PROCEDURE — 3008F BODY MASS INDEX DOCD: CPT | Mod: CPTII,S$GLB,, | Performed by: ORTHOPAEDIC SURGERY

## 2025-05-20 PROCEDURE — 99999 PR PBB SHADOW E&M-EST. PATIENT-LVL III: CPT | Mod: PBBFAC,,, | Performed by: ORTHOPAEDIC SURGERY

## 2025-05-20 PROCEDURE — 1159F MED LIST DOCD IN RCRD: CPT | Mod: CPTII,S$GLB,, | Performed by: ORTHOPAEDIC SURGERY

## 2025-05-20 NOTE — PROGRESS NOTES
EST PATIENT ORTHOPAEDIC: Knee    PRIMARY CARE PHYSICIAN: Joana Goldberg MD   REFERRING PROVIDER: No referring provider defined for this encounter.     ASSESSMENT & PLAN:    Impression:  Right Knee Internal Derangement (medial meniscus)  Early Right knee osteoarthritis  Right Knee Pes Bursitis     Follow Up Plan: PRN    Non operative care:    Wendy Cody has physical exam evidence of above and wishes to pursue an non-operative care. I am recommending the following: activity modification, observation    To review:  Patient comes in with a greater than six-month history of atraumatic onset right knee pain.  This pain was something that she could initially tolerate and live with in his localized to the medial aspect of her knee.  She associates this with exercise.  Over the past few months her pain has been persistent but her symptoms have changed.  She is now reporting more instability of her knee.  She does report intermittent mechanical symptoms in her knee which are not always particularly painful.  This instability is worse when going down stairs.  The mechanical symptoms are worse when squatting.  Clinically she has some mild medial joint line pain but more significant pain localized over her pes bursa.  That would be consistent with a pes bursitis.  However I can not blame instability on her bursitis.  I think she may have some degenerative meniscus based pathology that is attributing to her instability.  But she is already in physical therapy for both her back and her knee and not providing her relief with regard to this instability.  Last visit we obtained an MRI for understanding definitive pathology.  That MRI demonstrated some early arthritic changes and a medial based meniscus tear.  I do feel the meniscus tears really what is driving her instability and possibly her pain is this plus some early arthritis.  We discussed treatment options including injection, oral medications, PT, HEP.  She is not  really interested in a quick fix to this and surgery as last resort.  She would like to try turmeric and some holistic treatments in efforts to help with some of her inflammation and pain.  She is going to do certain activity modifications to her exercise program that may help with her pain and strengthening.  If she has more debilitating instability symptoms or pain happy to see her back we would likely push for injection based treatment although could consider referral for arthroscopic intervention.    The patient has been ordered:  None    CONSULTS:   None    ACTIVE PROBLEM LIST  Problem List[1]        SUBJECTIVE    CHIEF COMPLAINT: Knee Pain    HPI:   Wendy Cody is a 54 y.o. female here for evaluation and management of right knee pain. There is not a specific incident that brought about this pain. she has had progressive problems with the knee(s) starting 6 months ago but is now progressing to interfere with activities which include: walking, participating in family activities, enjoying hobbies, and exercise and stairs    Currently the pain in the joint is rated at mild with activity. The pain is intermittent and is located in the knee, located medially. The pain is described as aching, sharp, and throbbing. Relieving factors include rest, over the counter medication , and repositioning.     There is associated Clicking and Popping with instability     Wendy Cody has no additional complaints.     5/20/25:  Here for MRI results    PROGRESSIVE SYMPTOMS:  Pain worsened by weight bearing  Pain effecting living situation  Pain limiting ability to stay fit and healthy    FUNCTIONAL STATUS:   Participate in recreational activities     PREVIOUS TREATMENTS:  Medical: Tylenol  Physical Therapy: Activities Modified , Formal Physical Therapy for 6 weeks, and Home Exercise Program  Previous Orthopaedic Surgery: None    REVIEW OF SYSTEMS:  PAIN ASSESSMENT:  See HPI.  MUSCULOSKELETAL: See HPI.  OTHER 10 point review  of systems is negative except as stated in HPI above    PAST MEDICAL HISTORY   has a past medical history of TBI (traumatic brain injury).     PAST SURGICAL HISTORY   has a past surgical history that includes  section; Bunionectomy; hand surgery; Tonsillectomy; Nasal polyp excision; Fracture surgery (); and Adenoidectomy ().     FAMILY HISTORY  family history includes Alzheimer's disease in her maternal grandmother and paternal grandmother; Arthritis in her maternal grandmother and paternal grandmother; Cancer in her mother and sister; Depression in her sister and sister; Diabetes in her mother; Diabetes type II in her mother; Heart attack (age of onset: 38) in her father; Heart disease in her father; Hyperlipidemia in her father; Lymphoma in her mother; Ovarian cancer (age of onset: 38) in her sister.     SOCIAL HISTORY   reports that she has never smoked. She has never used smokeless tobacco. She reports that she does not currently use alcohol.     ALLERGIES   Review of patient's allergies indicates:   Allergen Reactions    Bee pollen Swelling     Bee Sting, not pollen per patient    Chicken derived Nausea And Vomiting    Poultry Nausea And Vomiting    Turkey Nausea And Vomiting    Venom-honey bee Other (See Comments)        MEDICATIONS  Medications Ordered Prior to Encounter[2]       PHYSICAL EXAM   vitals were not taken for this visit.   There is no height or weight on file to calculate BMI.      All other systems deferred.  GENERAL:  No acute distress  HABITUS: Obese  GAIT: Non-antalgic  SKIN: Normal  and No erythema, warmth, fluctuance     KNEE EXAM:    right:   Effusion: Minimal joint effusion  TTP: No over Medial/Lateral joint line, MCL, LCL, IT band.  There is tenderness over pes bursa  No crepitus with passive knee ROM  Passive ROM: Extension 0, Flexion 130  No pain with manipulation of patella  Stable to varus/valgus stress. No increased laxity to anterior/posterior drawer  testing  negative Tj's test  No pain with IR/ER rotation of the hip  5/5 strength in knee flexion and extension, ankle plantarflexion and dorsiflexion  Neurovascular Status: Sensation intact to light touch in Sural, Saphenous, SPN, DPN, Tibial nerve distribution  2+ pulse DP/PT, normal capillary refill, foot has normal warmth    DATA:  Diagnostic tests reviewed for today's visit:     2v of the knee reveal appears normal for age. There is good alignment with no evidence of fracture, bony abnormalities or signficant degenerative changes.    MRI report and images reviewed independently which show   Small free edge tear/fraying of the medial meniscus body.  Second suspected vertical tear posterior horn.  Multifocal intermediate to high-grade chondromalacia medial femorotibial compartment with early osteoarthritis as described.  Mostly low-grade chondromalacia lateral femorotibial and patellofemoral compartment.       [1]   Patient Active Problem List  Diagnosis    Lumbar spondylosis    DDD (degenerative disc disease), lumbar    Lumbar radiculopathy    Sleep-related breathing disorder    Ruptured varicosity    MOUNA on CPAP    Weakness   [2]   Current Outpatient Medications on File Prior to Visit   Medication Sig Dispense Refill    flavoring agent, bulk, (GRAPEFRUIT) Oil by Misc.(Non-Drug; Combo Route) route.      hydrocortisone-pramoxine (PROCTOFOAM-HS) rectal foam Place 1 applicator rectally 2 (two) times daily. 10 g 5    THYME OIL MISC by Misc.(Non-Drug; Combo Route) route.      UNABLE TO FIND once daily. medication name: femigen      UNABLE TO FIND 2 (two) times a day. medication name: cortistop      UNABLE TO FIND medication name: lemongrass oil       No current facility-administered medications on file prior to visit.

## 2025-05-28 ENCOUNTER — CLINICAL SUPPORT (OUTPATIENT)
Dept: REHABILITATION | Facility: HOSPITAL | Age: 55
End: 2025-05-28
Payer: COMMERCIAL

## 2025-05-28 DIAGNOSIS — R53.1 WEAKNESS: Primary | ICD-10-CM

## 2025-05-28 PROCEDURE — 97112 NEUROMUSCULAR REEDUCATION: CPT | Mod: PN

## 2025-05-28 PROCEDURE — 97110 THERAPEUTIC EXERCISES: CPT | Mod: PN

## 2025-05-28 NOTE — PROGRESS NOTES
Outpatient Rehab    Physical Therapy Visit    Patient Name: Wendy Cody  MRN: [id number]  YOB: 1970  Encounter Date: 5/28/2025    Therapy Diagnosis:   Encounter Diagnosis   Name Primary?    Weakness Yes     Physician: Joana Goldberg MD    Physician Orders: Eval and Treat  Medical Diagnosis: Chronic pain of right knee  Chronic midline low back pain without sciatica  Surgical Diagnosis: Not applicable for this Episode   Surgical Date: Not applicable for this Episode  Days Since Last Surgery: Not applicable for this Episode    Visit # / Visits Authorized:  4 / 10  Insurance Authorization Period: 4/16/2025 to 12/31/2025  Date of Evaluation: 4/16/2025  Plan of Care Certification: 5/11/2025 to 10/30/2025      PT/PTA:     Number of PTA visits since last PT visit:   Time In: 1500   Time Out: 1600  Total Time (in minutes): 60   Total Billable Time (in minutes):      FOTO:  Intake Score:  %  Survey Score 2:  %  Survey Score 3:  %    Precautions:       Subjective   Patient reports that she continues to have knee pain and she is starting to feel low back pain as well. She states that she has not been consistent with her exercises because she had a lot going on..  Pain reported as 3/10.      Objective            Treatment:  Therapeutic Exercise  TE 1: Supine dead bugs with legs and arm extension /c swiss ball 3x8  TE 6: Sled push/pull 45 lbs 3 laps  Balance/Neuromuscular Re-Education  NMR 1: SLR /c ER +3# 3x10  NMR 2: Supine hamstring curl on swiss ball 2x10  NMR 3: RDLs with barbell, 25 lbs ea, 3x10  NMR 4: Wall sits 4-5x35 sec  NMR 5: Longsitting hamstring stretch 3x30 sec  NMR 6: Standing quad stretch 3-4x30 sec  NMR 7: Lateral walks /c GreenTB 3 laps  NMR 8: Monster walks /c GreenTB 3 laps  NMR 9: Reverse monster walks /c GreenTB 3 laps    Time Entry(in minutes):  Neuromuscular Re-Education Time Entry: 45  Therapeutic Exercise Time Entry: 15    Assessment & Plan   Assessment: Patient presents to the  clinic with mild symptom irritability pre-session, reporting 3/10 R knee and low back pain. Patient demonstrates decreased quadriceps and hamstring flexibility, decreased core strength/stability, and decreased lumbopelvic control. Focused was placed on improving these impairments. Patient tolerated today's treatment without increase in pain. Patient demonstrated improvements in pain within session. Will progress patient as tolerated.   Evaluation/Treatment Tolerance: Patient tolerated treatment well    The patient will continue to benefit from skilled outpatient physical therapy in order to address the deficits listed in the problem list on the initial evaluation, provide patient and family education, and maximize the patients level of independence in the home and community environments.     The patient's spiritual, cultural, and educational needs were considered, and the patient is agreeable to the plan of care and goals.           Plan: Improve R knee pain, and lumbar stability and lumbopelvic control.    Goals:   Active       Functional outcome       Patient will show a significant change in FOTO patient-reported outcome tool to demonstrate subjective improvement       Start:  05/11/25    Expected End:  10/30/25            Patient will demonstrate independence in home program for support of progression       Start:  05/11/25    Expected End:  10/30/25               Pain       Patient will report pain of 0/10 demonstrating a reduction of overall pain       Start:  05/11/25    Expected End:  10/30/25               Strength       Patient will demonstrate abdominal strength by her ability to hold a forearm plank for 1 minute and bilateral side plank for 45 seconds or greater with no c/o pain.       Start:  05/11/25    Expected End:  10/30/25            Patient will achieve bilateral hip flexion strength of 5/5       Start:  05/11/25    Expected End:  10/30/25            Patient will achieve bilateral hip extension  strength of 5/5       Start:  05/11/25    Expected End:  10/30/25            Patient will achieve bilateral hip abduction strength of 4+/5       Start:  05/11/25    Expected End:  10/30/25                Judah Mendiola PT

## 2025-06-04 ENCOUNTER — OFFICE VISIT (OUTPATIENT)
Dept: FAMILY MEDICINE | Facility: CLINIC | Age: 55
End: 2025-06-04
Payer: COMMERCIAL

## 2025-06-04 VITALS
HEIGHT: 67 IN | DIASTOLIC BLOOD PRESSURE: 78 MMHG | SYSTOLIC BLOOD PRESSURE: 122 MMHG | TEMPERATURE: 98 F | HEART RATE: 79 BPM | BODY MASS INDEX: 32.39 KG/M2 | OXYGEN SATURATION: 96 % | WEIGHT: 206.38 LBS

## 2025-06-04 DIAGNOSIS — N76.0 ACUTE VAGINITIS: Primary | ICD-10-CM

## 2025-06-04 PROCEDURE — 3008F BODY MASS INDEX DOCD: CPT | Mod: CPTII,S$GLB,, | Performed by: FAMILY MEDICINE

## 2025-06-04 PROCEDURE — 99999 PR PBB SHADOW E&M-EST. PATIENT-LVL III: CPT | Mod: PBBFAC,,, | Performed by: FAMILY MEDICINE

## 2025-06-04 PROCEDURE — 3078F DIAST BP <80 MM HG: CPT | Mod: CPTII,S$GLB,, | Performed by: FAMILY MEDICINE

## 2025-06-04 PROCEDURE — 1159F MED LIST DOCD IN RCRD: CPT | Mod: CPTII,S$GLB,, | Performed by: FAMILY MEDICINE

## 2025-06-04 PROCEDURE — 99214 OFFICE O/P EST MOD 30 MIN: CPT | Mod: S$GLB,,, | Performed by: FAMILY MEDICINE

## 2025-06-04 PROCEDURE — 81515 NFCT DS BV&VAGINITIS DNA ALG: CPT | Performed by: FAMILY MEDICINE

## 2025-06-04 PROCEDURE — 3074F SYST BP LT 130 MM HG: CPT | Mod: CPTII,S$GLB,, | Performed by: FAMILY MEDICINE

## 2025-06-04 RX ORDER — TERCONAZOLE 4 MG/G
1 CREAM VAGINAL NIGHTLY
Qty: 7 G | Refills: 0 | Status: SHIPPED | OUTPATIENT
Start: 2025-06-04

## 2025-06-04 RX ORDER — FLUCONAZOLE 150 MG/1
150 TABLET ORAL ONCE
Qty: 1 TABLET | Refills: 0 | Status: SHIPPED | OUTPATIENT
Start: 2025-06-04 | End: 2025-06-04

## 2025-06-06 LAB
BACTERIAL VAGINOSIS DNA (OHS): NOT DETECTED
CANDIDA GLABRATA/KRUSEI DNA (OHS): NOT DETECTED
CANDIDA SPECIES DNA (OHS): DETECTED
TRICHOMONAS VAGINALIS DNA (OHS): NOT DETECTED

## 2025-06-12 ENCOUNTER — CLINICAL SUPPORT (OUTPATIENT)
Dept: REHABILITATION | Facility: HOSPITAL | Age: 55
End: 2025-06-12
Payer: COMMERCIAL

## 2025-06-12 DIAGNOSIS — R53.1 WEAKNESS: Primary | ICD-10-CM

## 2025-06-12 PROCEDURE — 97110 THERAPEUTIC EXERCISES: CPT | Mod: PN

## 2025-06-12 PROCEDURE — 97112 NEUROMUSCULAR REEDUCATION: CPT | Mod: PN

## 2025-06-12 NOTE — PROGRESS NOTES
Outpatient Rehab    Physical Therapy Progress Note    Patient Name: Wendy Cody  MRN: [id number]  YOB: 1970  Encounter Date: 6/12/2025    Therapy Diagnosis:   Encounter Diagnosis   Name Primary?    Weakness Yes     Physician: Joana Goldberg MD    Physician Orders: Eval and Treat  Medical Diagnosis: Chronic pain of right knee  Chronic midline low back pain without sciatica  Surgical Diagnosis: Not applicable for this Episode   Surgical Date: Not applicable for this Episode  Days Since Last Surgery: Not applicable for this Episode    Visit # / Visits Authorized:  4 / 10  Insurance Authorization Period: 4/16/2025 to 12/31/2025  Date of Evaluation: 4/16/2025  Plan of Care Certification: 5/11/2025 to 10/30/2025      PT/PTA:     Number of PTA visits since last PT visit:   Time In: 0700   Time Out: 0800  Total Time (in minutes): 60   Total Billable Time (in minutes):      FOTO:  Intake Score:  %  Survey Score 2:  %  Survey Score 3:  %    Precautions:       Subjective   Patient reports that she has been in a little bit more pain than usual. Yesterday, everything was hurting including her lower back.She states that she is not sure what caused her increased symptoms..  Pain reported as 5/10.      Objective      Lumbar Range of Motion   Active (deg) Passive (deg) Pain   Flexion  (no limitation)   Yes   Extension  (no limitation)   Yes   Right Lateral Flexion  (no limitation)   Yes   Right Rotation  (no limitation and no pain)       Left Lateral Flexion  (no limitation)       Left Rotation  (no limitation and no pain)                        Treatment:  Therapeutic Exercise  TE 1: Supine dead bugs with legs and arm extension /c swiss ball 3x10  TE 6: Sled push/pull 45 lbs 3 laps  Balance/Neuromuscular Re-Education  NMR 1: SLR /c ER +3# 3x10  NMR 2: Supine hamstring curl on swiss ball 2x10  NMR 3: RDLs with barbell, 25 lbs ea, 3x10  NMR 4: Wall sits 4-5x35 sec  NMR 5: Longsitting hamstring stretch 3x30  sec  NMR 6: Standing quad stretch 3-4x30 sec  NMR 7: Lateral walks /c GreenTB 3 laps  NMR 8: Monster walks /c GreenTB 3 laps  NMR 9: Reverse monster walks /c GreenTB 3 laps      Time Entry(in minutes):  Neuromuscular Re-Education Time Entry: 50  Therapeutic Exercise Time Entry: 10    Assessment & Plan   Assessment: Patient presents to the clinic with mild symptom irritability pre-session, reporting 5/10 R knee and low back pain. Patient demonstrates decreased quadriceps and hamstring flexibility, decreased core strength/stability, and decreased lumbopelvic control. Continued focus on improving these impairments. Patient tolerated today's treatment without increase in pain. Will progress patient as tolerated.   Evaluation/Treatment Tolerance: Patient tolerated treatment well    The patient will continue to benefit from skilled outpatient physical therapy in order to address the deficits listed in the problem list on the initial evaluation, provide patient and family education, and maximize the patients level of independence in the home and community environments.     The patient's spiritual, cultural, and educational needs were considered, and the patient is agreeable to the plan of care and goals.           Plan: Improve R knee pain, and lumbar stability and lumbopelvic control.    Goals:   Active       Functional outcome       Patient will show a significant change in FOTO patient-reported outcome tool to demonstrate subjective improvement       Start:  05/11/25    Expected End:  10/30/25            Patient will demonstrate independence in home program for support of progression       Start:  05/11/25    Expected End:  10/30/25               Pain       Patient will report pain of 0/10 demonstrating a reduction of overall pain       Start:  05/11/25    Expected End:  10/30/25               Strength       Patient will demonstrate abdominal strength by her ability to hold a forearm plank for 1 minute and bilateral  side plank for 45 seconds or greater with no c/o pain.       Start:  05/11/25    Expected End:  10/30/25            Patient will achieve bilateral hip flexion strength of 5/5       Start:  05/11/25    Expected End:  10/30/25            Patient will achieve bilateral hip extension strength of 5/5       Start:  05/11/25    Expected End:  10/30/25            Patient will achieve bilateral hip abduction strength of 4+/5       Start:  05/11/25    Expected End:  10/30/25                Judah Mendiola, PT

## 2025-06-25 ENCOUNTER — CLINICAL SUPPORT (OUTPATIENT)
Dept: REHABILITATION | Facility: HOSPITAL | Age: 55
End: 2025-06-25
Payer: COMMERCIAL

## 2025-06-25 DIAGNOSIS — R53.1 WEAKNESS: Primary | ICD-10-CM

## 2025-06-25 PROCEDURE — 97112 NEUROMUSCULAR REEDUCATION: CPT | Mod: PN

## 2025-06-25 PROCEDURE — 97110 THERAPEUTIC EXERCISES: CPT | Mod: PN

## 2025-06-25 NOTE — PROGRESS NOTES
Outpatient Rehab    Physical Therapy Visit    Patient Name: Wendy Cody  MRN: [id number]  YOB: 1970  Encounter Date: 6/25/2025    Therapy Diagnosis:   Encounter Diagnosis   Name Primary?    Weakness Yes     Physician: Joana Goldberg MD    Physician Orders: Eval and Treat  Medical Diagnosis: Chronic pain of right knee  Chronic midline low back pain without sciatica  Surgical Diagnosis: Not applicable for this Episode   Surgical Date: Not applicable for this Episode  Days Since Last Surgery: Not applicable for this Episode    Visit # / Visits Authorized:  4 / 10  Insurance Authorization Period: 4/16/2025 to 12/31/2025  Date of Evaluation: 4/16/2025  Plan of Care Certification: 5/11/2025 to 10/30/2025      PT/PTA:     Number of PTA visits since last PT visit:   Time In: 1500   Time Out: 1600  Total Time (in minutes): 60   Total Billable Time (in minutes):      FOTO:  Intake Score:  %  Survey Score 2:  %  Survey Score 3:  %    Precautions:       Subjective   Patient presents to PT reporting LBP and knee pain. She thinks the rainy weather and decreased activity due to having a lot going on may be contributing to her increased pain..  Pain reported as 5/10.      Objective            Treatment:  Therapeutic Exercise  TE 1: Supine dead bugs with legs and arm extension /c swiss ball 3x10  TE 3: Upright bike 10 minutes  TE 4: +Prayer stretch 3x30 sec  TE 7: +LTR /c physioball 3x10  TE 8: +DKTC /c physioball 3x10  TE 9: +Bridges /c BlueTB 3x10  TE 10: +SL clamshells /c BlueTB 3x10  Balance/Neuromuscular Re-Education  NMR 1: Supine dead bugs with legs and arm extension /c swiss ball 3x10  NMR 2: Supine bridge /c hamstring curl on swiss ball 2x10  NMR 5: Longsitting hamstring stretch 3x30 sec  NMR 6: Standing quad stretch 3-4x30 sec  NMR 10: +Forearm plank 3x45 sec    Time Entry(in minutes):  Neuromuscular Re-Education Time Entry: 30  Therapeutic Exercise Time Entry: 30    Assessment & Plan   Assessment:  Patient presents to the clinic with mild symptom irritability pre-session, reporting 5/10 R knee and low back pain that she associates to decreased activity secondary to having a lot going on. Patient demonstrates decreased quadriceps and hamstring flexibility, decreased core strength/stability, and decreased lumbopelvic control. Continued focus on improving these impairments. Added forearm plank today. She demonstrates decreased endurance, requiring rest break on her 3rd rep. Patient tolerated today's treatment without increase in pain. Will progress patient as tolerated.   Evaluation/Treatment Tolerance: Patient tolerated treatment well    The patient will continue to benefit from skilled outpatient physical therapy in order to address the deficits listed in the problem list on the initial evaluation, provide patient and family education, and maximize the patients level of independence in the home and community environments.     The patient's spiritual, cultural, and educational needs were considered, and the patient is agreeable to the plan of care and goals.           Plan: Improve R knee pain, and lumbar stability and lumbopelvic control.    Goals:   Active       Functional outcome       Patient will show a significant change in FOTO patient-reported outcome tool to demonstrate subjective improvement       Start:  05/11/25    Expected End:  10/30/25            Patient will demonstrate independence in home program for support of progression       Start:  05/11/25    Expected End:  10/30/25               Pain       Patient will report pain of 0/10 demonstrating a reduction of overall pain       Start:  05/11/25    Expected End:  10/30/25               Strength       Patient will demonstrate abdominal strength by her ability to hold a forearm plank for 1 minute and bilateral side plank for 45 seconds or greater with no c/o pain.       Start:  05/11/25    Expected End:  10/30/25            Patient will achieve  bilateral hip flexion strength of 5/5       Start:  05/11/25    Expected End:  10/30/25            Patient will achieve bilateral hip extension strength of 5/5       Start:  05/11/25    Expected End:  10/30/25            Patient will achieve bilateral hip abduction strength of 4+/5       Start:  05/11/25    Expected End:  10/30/25                Judah Mendiola, PT